# Patient Record
Sex: FEMALE | Race: WHITE | NOT HISPANIC OR LATINO | Employment: OTHER | URBAN - METROPOLITAN AREA
[De-identification: names, ages, dates, MRNs, and addresses within clinical notes are randomized per-mention and may not be internally consistent; named-entity substitution may affect disease eponyms.]

---

## 2017-07-11 ENCOUNTER — HOSPITAL ENCOUNTER (OUTPATIENT)
Dept: GENERAL RADIOLOGY | Facility: HOSPITAL | Age: 67
Discharge: HOME | End: 2017-07-11
Attending: INTERNAL MEDICINE | Admitting: INTERNAL MEDICINE

## 2017-07-11 DIAGNOSIS — M17.9 OA (OSTEOARTHRITIS) OF KNEE: ICD-10-CM

## 2017-07-11 PROCEDURE — 73560 X-RAY EXAM OF KNEE 1 OR 2: CPT

## 2017-11-16 ENCOUNTER — OFFICE VISIT (OUTPATIENT)
Dept: ORTHOPEDIC SURGERY | Facility: CLINIC | Age: 67
End: 2017-11-16

## 2017-11-16 VITALS — WEIGHT: 211 LBS | HEIGHT: 66 IN | BODY MASS INDEX: 33.91 KG/M2 | TEMPERATURE: 98.5 F

## 2017-11-16 DIAGNOSIS — M17.11 PRIMARY OSTEOARTHRITIS OF RIGHT KNEE: Primary | ICD-10-CM

## 2017-11-16 PROCEDURE — 99213 OFFICE O/P EST LOW 20 MIN: CPT | Performed by: ORTHOPAEDIC SURGERY

## 2017-11-16 RX ORDER — UBIDECARENONE 30 MG
CAPSULE ORAL
COMMUNITY
Start: 2015-09-16 | End: 2022-09-29

## 2017-11-16 RX ORDER — PHENOL 1.4 %
600 AEROSOL, SPRAY (ML) MUCOUS MEMBRANE 2 TIMES DAILY WITH MEALS
COMMUNITY
End: 2022-09-29

## 2017-11-16 RX ORDER — SENNOSIDES 8.6 MG
650 CAPSULE ORAL EVERY 8 HOURS PRN
COMMUNITY

## 2017-11-16 RX ORDER — LEVOTHYROXINE SODIUM 0.03 MG/1
25 TABLET ORAL DAILY
COMMUNITY
Start: 2015-09-16
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
## 2017-12-01 ENCOUNTER — HOSPITAL ENCOUNTER (OUTPATIENT)
Dept: PHYSICAL THERAPY | Facility: HOSPITAL | Age: 67
Setting detail: THERAPIES SERIES
Discharge: HOME | End: 2017-12-01
Attending: ORTHOPAEDIC SURGERY

## 2017-12-01 DIAGNOSIS — G89.29 CHRONIC PAIN OF RIGHT KNEE: Primary | ICD-10-CM

## 2017-12-01 DIAGNOSIS — M25.561 CHRONIC PAIN OF RIGHT KNEE: Primary | ICD-10-CM

## 2017-12-01 DIAGNOSIS — Z74.09 IMPAIRED MOBILITY: ICD-10-CM

## 2017-12-01 PROCEDURE — G8979 MOBILITY GOAL STATUS: HCPCS | Performed by: PHYSICAL THERAPIST

## 2017-12-01 PROCEDURE — G8978 MOBILITY CURRENT STATUS: HCPCS | Performed by: PHYSICAL THERAPIST

## 2017-12-01 PROCEDURE — 97161 PT EVAL LOW COMPLEX 20 MIN: CPT | Performed by: PHYSICAL THERAPIST

## 2017-12-01 PROCEDURE — 97110 THERAPEUTIC EXERCISES: CPT | Performed by: PHYSICAL THERAPIST

## 2017-12-01 NOTE — THERAPY EVALUATION
"    Outpatient Physical Therapy Ortho Initial Evaluation  [facility]     Patient Name: Marjorie MORRIS  : 1950  MRN: [id number]  Today's Date: 2017      Visit Date: 2017    There is no problem list on file for this patient.       No past medical history on file.     Past Surgical History:   Procedure Laterality Date   •  SECTION     • HIP SURGERY Right    • TONSILLECTOMY         Visit Dx:     ICD-10-CM ICD-9-CM   1. Chronic pain of right knee M25.561 719.46    G89.29 338.29   2. Impaired mobility Z74.09 799.89             Patient History       17 1300          History    Chief Complaint Difficulty Walking;Difficulty with daily activities;Pain  -GJ      Type of Pain Knee pain   R  -GJ      Date Current Problem(s) Began --   >/= 1 year  -GJ      Brief Description of Current Complaint Ms. Morris is a 66 y/o female. She reports apparently insidious onset of R knee pain >/= 1 year ago.  She reports periods of exacerbation/improvement, 2017 having a significant exacerbation after gardening.  No treatment to date.  She reports balance feels off because of knee.  She \"doesn't want to lock her R knee out\".  Wants to avoid surgery.  She does belong to [employer] and has performed water exercises in the past.    -GJ      Previous treatment for THIS PROBLEM --   nothing  -GJ      Onset Date- PT 17  -GJ      Patient/Caregiver Goals Relieve pain;Improve mobility;Know what to do to help the symptoms  -GJ      Occupation/sports/leisure activities gardening, singing  -GJ      What clinical tests have you had for this problem? X-ray  -GJ      Are you or can you be pregnant No  -GJ      Pain     Pain Location Knee   R  -GJ      Pain at Present 1  -GJ      Pain at Best 1  -GJ      Pain at Worst 4  -GJ      What Performance Factors Make the Current Problem(s) WORSE? walking, steps (descending more then ascendig), sit to stand  -GJ      Fall Risk Assessment    Any falls in the past year: No  " -GJ      Daily Activities    Primary Language English  -GJ      Are you able to read Yes  -GJ      Are you able to write Yes  -GJ      How does patient learn best? Reading  -GJ      Teaching needs identified Home Exercise Program;Management of Condition  -GJ      Barriers to learning None  -GJ      Pt Participated in POC and Goals Yes  -GJ      Safety    Are you being hurt, hit, or frightened by anyone at home or in your life? No  -GJ      Are you being neglected by a caregiver No  -GJ        User Key  (r) = Recorded By, (t) = Taken By, (c) = Cosigned By    Initials Name Provider Type    JOSE LUIS Schroeder, PT Physical Therapist                PT Ortho       12/01/17 1400    Posture/Observations    Alignment Options Genu valgus  -GJ    Genu valgus Bilateral:;Moderate  -GJ    Quarter Clearing    Quarter Clearing Lower Quarter Clearing  -GJ    DTR- Lower Quarter Clearing    Patellar tendon (L2-4) Bilateral:;2- Normal response  -GJ    Achilles tendon (S1-2) Bilateral:;2- Normal response  -GJ    Myotomal Screen- Lower Quarter Clearing    Hip flexion (L2) Bilateral:;4 (Good)  -GJ    Ankle DF (L4) Bilateral:;4+ (Good +)  -GJ    Ankle PF (S1) Bilateral:;3- (Fair -)  -GJ    Special Tests/Palpation    Special Tests/Palpation Knee  -GJ    Knee Palpation    Knee Palpation? Yes   ? R baker's cyst, (+) trigger points B gastrocs  -GJ    Knee Special Tests    Oz’s sign (DVT) Bilateral:;Negative  -GJ    ROM (Range of Motion)    General ROM lower extremity range of motion deficits identified  -GJ    General LE Assessment    ROM knee, left: LE ROM deficit;knee, right: LE ROM deficit  -GJ    Left Knee    Extension/Flexion AROM Deficit   -GJ    Extension/Flexion PROM Deficit 8-125  -GJ    Right Knee    Extension/Flexion AROM Deficit   -GJ    Extension/Flexion PROM Deficit   -GJ    MMT (Manual Muscle Testing)    General MMT Assessment lower extremity strength deficits identified  -GJ    Left Hip    Hip Extension  Gross Movement (4/5) good;(4+/5) good plus  -GJ    Hip ABduction Gross Movement (4/5) good;(4+/5) good plus  -GJ    Right Hip    Hip Extension Gross Movement (4/5) good;(4+/5) good plus  -GJ    Hip ABduction Gross Movement (4/5) good;(4+/5) good plus  -GJ    Lower Extremity    Lower Ext Manual Muscle Testing left hip strength deficit;right hip strength deficit;left knee strength deficit;right knee strength deficit  -GJ    Left Knee    Knee Extension Gross Movement (4+/5) good plus;(4/5) good   within available range  -GJ    Knee Flexion Gross Movement (4/5) good;(4+/5) good plus   within available ragne  -GJ    Right Knee    Knee Extension Gross Movement (4/5) good;(4+/5) good plus   within available ragne  -GJ    Knee Flexion Gross Movement (4/5) good;(4+/5) good plus   within available ragne  -GJ    Flexibility    Flexibility Tested? Lower Extremity  -GJ    Lower Extremity Flexibility    Hamstrings Bilateral:;Moderately limited  -GJ    Hip Flexors Bilateral:;Moderately limited  -GJ    Quadriceps Bilateral:;Moderately limited  -GJ    Hip External Rotators Bilateral:;Moderately limited  -GJ    Hip Internal Rotators Bilateral:;Moderately limited  -GJ    Gastrocnemius Bilateral:;Moderately limited  -GJ      User Key  (r) = Recorded By, (t) = Taken By, (c) = Cosigned By    Initials Name Provider Type    JOSE LUIS Schroeder PT Physical Therapist                            Therapy Education       12/01/17 3612          Therapy Education    Education Details discussed dx, px, poc, discused anatomy of the knee/lower kinetic chain, and physiology of healing. Discussed realistic expectations including time frames, including probable soreness for first several weeks.  Discussed importance of performing HEP on an independent basis during and after therapy is completed to allow for optimal outcomes, discussed shoe wear, discussed benefits of use of cane to decrease stress to tissues.  discussed use of ice, discussed benefits of  aquatc therapy.  HEP to be performed once every other day.   -GJ      Given HEP;Symptoms/condition management;Pain management;Posture/body mechanics;Edema management;Mobility training  -GJ      Program New  -GJ      How Provided Verbal;Demonstration;Written  -GJ      Provided to Patient  -GJ      Level of Understanding Teach back education performed;Verbalized;Demonstrated  -GJ        User Key  (r) = Recorded By, (t) = Taken By, (c) = Cosigned By    Initials Name Provider Type    JOSE LUIS Schroeder, PT Physical Therapist                PT OP Goals       12/01/17 1400       PT Short Term Goals    STG Date to Achieve 01/05/18  -GJ     STG 1 pt. to be I with initial HEP to facilitate self management of their condition  -GJ     STG 1 Progress New  -GJ     STG 2 pt. to be educated in/verbalize understanding of the importance of posture/ergonomics in association with their condition to facilitate self management of their condition  -GJ     STG 2 Progress New  -GJ     Long Term Goals    LTG Date to Achieve 02/02/18  -GJ     LTG 1 pt. to be I with advanced HEP to facilitate self management of their condition  -GJ     LTG 1 Progress New  -GJ     LTG 2 pt to demonstrate R knee AROM extension bettern then or equal to 8 degrees short of neutral to faciliate ease/safety with gait  -GJ     LTG 2 Progress New  -GJ     LTG 3 pt to ascend/descend stairs reciprocally with </= 1 rail to facilitate ease/safety with household/community mobility  -GJ     LTG 3 Progress New  -GJ     LTG 4 pt to demonstrate near normal heel to toe gait pattern (with/without AD) to facilitate ease/safety with community mobility  -GJ     LTG 4 Progress New  -GJ     Time Calculation    PT Goal Re-Cert Due Date 02/01/18  -GJ       User Key  (r) = Recorded By, (t) = Taken By, (c) = Cosigned By    Initials Name Provider Type    JOSE LUIS Schroeder PT Physical Therapist                PT Assessment/Plan       12/01/17 1414       PT Assessment    Functional  "Limitations Impaired gait;Impaired locomotion;Limitation in home management;Performance in self-care ADL;Performance in leisure activities;Limitations in community activities  -GJ     Impairments Gait;Impaired flexibility;Muscle strength;Pain;Impaired postural alignment;Poor body mechanics;Joint mobility;Impaired muscle length;Range of motion  -GJ     Assessment Comments Ms. Almaraz is a 68 y/o female, history of R BRIAN 2013. She reports apparently insidious onset of R knee pain >/= 1 year ago.  She reports periods of exacerbation/improvement, 9/2017 having a significant exacerbation after gardening.  No treatment to date.  She reports balance feels off because of knee.  She \"doesn't want to lock her R knee out\".  Wants to avoid surgery.  She does belong to [facility] [place] and has performed water exercises in the past.  Ms. Almaraz ambulates with decrease pelvic trunk disassociation, bilateral R>L genu valgus.  She demonstrates limited R knee A/PROM (A: , PROM  more so then L (, P 8-125).  She demonstrates gross weakness of bilateral LE tissues, demonstrates, shorted HS, hip flexor, gastroc/soleus tissues.  Ms. Almaraz demonstrates Eason's cyst like pocket of fluid posterior R knee.  She demonstrates s/s consistent of stable, intermittently chronic R > L knee dengeneative changes which limit her participation in household (has 3 story house), community, leisure activities.  She may benefit from skilled physical therapy intervention to address the above impairments, including establishment of an aquatic therapy program to be performed independently at the [facility] [place] (is a member).   -GJ     Please refer to paper survey for additional self-reported information Yes  -GJ     Rehab Potential Good  -GJ     Patient/caregiver participated in establishment of treatment plan and goals Yes  -GJ     Patient would benefit from skilled therapy intervention Yes  -GJ     PT Plan    PT Frequency 2x/week  -GJ     Predicted " Duration of Therapy Intervention (days/wks) 4-6 weeks   -GJ     Planned CPT's? PT EVAL LOW COMPLEXITY: 57721;PT RE-EVAL: 78966;PT THER PROC EA 15 MIN: 99119;PT MANUAL THERAPY EA 15 MIN: 36990;PT NEUROMUSC RE-EDUCATION EA 15 MIN: 94021;PT AQUATIC THERAPY EA 15 MIN: 21694;PT GAIT TRAINING EA 15 MIN: 77753;PT HOT OR COLD PACK TREAT MCARE;PT ELECTRICAL STIM UNATTEND:   -GJ     PT Plan Comments to perform 2-4 sessions in the pool to develop program for independent aquatic program (member of [employer]).  Work on hip girdle, quad, HS, gastrc strengthening, stretch gastroc, hip flexor/quad, HS, hip rotator tissues.  Then 2-4 sessions on land to develop a land based program as well.    -GJ       User Key  (r) = Recorded By, (t) = Taken By, (c) = Cosigned By    Initials Name Provider Type    JOSE LUIS Schroeder PT Physical Therapist                  Exercises       12/01/17 1400          Exercise 1    Exercise Name 1 SL hip abd, bilaterally  -GJ      Cueing 1 Demo  -GJ      Reps 1 12  -GJ      Exercise 2    Exercise Name 2 HR, standing  -GJ      Cueing 2 Demo  -GJ      Reps 2 15  -GJ      Exercise 3    Exercise Name 3 standign HS curl  -GJ      Cueing 3 Demo  -GJ      Reps 3 15  -GJ      Exercise 4    Exercise Name 4 seated TKE into ball  -GJ      Cueing 4 Demo  -GJ      Reps 4 15  -GJ      Time (Seconds) 4 5  -GJ      Exercise 5    Exercise Name 5 prone quad/hip flexor stretch with belt/sheet  -GJ      Cueing 5 Demo  -GJ      Reps 5 3  -GJ      Time (Seconds) 5 20  -GJ      Exercise 6    Exercise Name 6 seated HS stretch (edge of chair)  -GJ      Cueing 6 Demo  -GJ      Reps 6 3  -GJ      Time (Seconds) 6 20  -GJ        User Key  (r) = Recorded By, (t) = Taken By, (c) = Cosigned By    Initials Name Provider Type    JOSE LUIS Schroeder PT Physical Therapist                              Outcome Measures       12/01/17 1400          Knee Outcome Score    Knee Outcome Score Comments form incomplete  -GJ      Functional Assessment     Outcome Measure Options Knee Outcome Score- ADL  -GJ        User Key  (r) = Recorded By, (t) = Taken By, (c) = Cosigned By    Initials Name Provider Type    GJ Leroy Schroeder, PT Physical Therapist            Time Calculation:   Start Time: 1045  Stop Time: 1130  Time Calculation (min): 45 min     Therapy Charges for Today     Code Description Service Date Service Provider Modifiers Qty    75007604606 HC PT MOBILITY CURRENT 12/1/2017 Leroy Schroeder, PT GP, CJ 1    47126796529 HC PT MOBILITY PROJECTED 12/1/2017 Leroy Schroeder, PT GP, CI 1    31797873308 HC PT EVAL LOW COMPLEXITY 2 12/1/2017 Leroy Schroeder, PT GP 1    88654985528 HC PT THER PROC EA 15 MIN 12/1/2017 Leroy Schroeder, PT GP 1          PT G-Codes  PT Professional Judgement Used?: Yes  Outcome Measure Options: Knee Outcome Score- ADL  Functional Limitation: Mobility: Walking and moving around  Mobility: Walking and Moving Around Current Status (): At least 20 percent but less than 40 percent impaired, limited or restricted  Mobility: Walking and Moving Around Goal Status (): At least 1 percent but less than 20 percent impaired, limited or restricted         Leroy Schroeder, PT  12/1/2017

## 2017-12-04 ENCOUNTER — HOSPITAL ENCOUNTER (OUTPATIENT)
Dept: PHYSICAL THERAPY | Facility: HOSPITAL | Age: 67
Setting detail: THERAPIES SERIES
Discharge: HOME | End: 2017-12-04

## 2017-12-04 DIAGNOSIS — Z74.09 IMPAIRED MOBILITY: ICD-10-CM

## 2017-12-04 DIAGNOSIS — M25.561 CHRONIC PAIN OF RIGHT KNEE: Primary | ICD-10-CM

## 2017-12-04 DIAGNOSIS — G89.29 CHRONIC PAIN OF RIGHT KNEE: Primary | ICD-10-CM

## 2017-12-04 PROCEDURE — 97113 AQUATIC THERAPY/EXERCISES: CPT

## 2017-12-04 NOTE — THERAPY TREATMENT NOTE
"    Outpatient Physical Therapy Ortho Treatment Note  [facility]     Patient Name: Marjorie MORRIS  : 1950  MRN: [id number]  Today's Date: 2017      Visit Date: 2017    Visit Dx:    ICD-10-CM ICD-9-CM   1. Chronic pain of right knee M25.561 719.46    G89.29 338.29   2. Impaired mobility Z74.09 799.89       There is no problem list on file for this patient.       No past medical history on file.     Past Surgical History:   Procedure Laterality Date   •  SECTION     • HIP SURGERY Right    • TONSILLECTOMY                               PT Assessment/Plan       17 1553       PT Assessment    Assessment Comments Today was pts first aquatic session. Pt performed exercises without any immediate adverse affects. Pt required moderate cuing for TA activation. HS appeared tight with stretching. Pt reported that she used to perform aquatic exercises but [facility] Pt is appropriate to continue aquatic therapy to strengthen and decrease pain in knee.  -CK (r) NS (t) CK (c)     PT Plan    PT Plan Comments Assess ot reponse to first aquatic session; continue with current exercises, progress as tolerated. Consider performing step ups on 8\" step, and consider performing DKTC stretch.  -CK (r) NS (t) CK (c)       User Key  (r) = Recorded By, (t) = Taken By, (c) = Cosigned By    Initials Name Provider Type    QUEENIE Leavitt, PT Physical Therapist    BRIE Quick, PT Student PT Student                    Exercises       17 1500          Subjective Comments    Subjective Comments I'm not doing bad today, feeling good actually. I have a total hip replacement on the R, that hip feels tighter.  -CK (r) NS (t) CK (c)      Subjective Pain    Able to rate subjective pain? yes  -CK (r) NS (t) CK (c)      Pre-Treatment Pain Level 1  -CK (r) NS (t) CK (c)      Aquatics    Aquatics performed? Yes  -CK (r) NS (t) CK (c)      Aquatics LE    Water Walk forward;side;backward   x 3 " "laps  -CK (r) NS (t) CK (c)      Stretch 1 HS sweep x 10, SN  -CK (r) NS (t) CK (c)      Stretch 2 Quad stretch x 30s, SN  -CK (r) NS (t) CK (c)      Stretch 3 Piriformis stretch x 30s  -CK (r) NS (t) CK (c)      Stretch Other 1 Calf stretch x 30s B  -CK (r) NS (t) CK (c)      Stretch Other 2 Kickboard push pull x 10  -CK (r) NS (t) CK (c)      Abdominals noodle   SN x 15  -CK (r) NS (t) CK (c)      Clams HS curls x 15  -CK (r) NS (t) CK (c)      Hip Abd/Add x15  -CK (r) NS (t) CK (c)      Hip Flex/Ext Flex x 10, cue for quad set  -CK (r) NS (t) CK (c)      March in Place x 2 laps  -CK (r) NS (t) CK (c)      Mini Squat x 10  -CK (r) NS (t) CK (c)      Toe/Heel Raises tiptoe/tandem x 2 laps  -CK (r) NS (t) CK (c)      Uni-Squat Blue ring push down x 15 B  -CK (r) NS (t) CK (c)      Uni-Clock 10/10 cw/ccw  -CK (r) NS (t) CK (c)      Step Ups 10/leg, 4\" step  -CK (r) NS (t) CK (c)        User Key  (r) = Recorded By, (t) = Taken By, (c) = Cosigned By    Initials Name Provider Type    CK Art Leavitt, PT Physical Therapist    BRIE Quick, PT Student PT Student                               PT OP Goals       12/04/17 1700       PT Short Term Goals    STG Date to Achieve 01/05/18  -CK     STG 1 pt. to be I with initial HEP to facilitate self management of their condition  -CK     STG 1 Progress Ongoing  -CK     STG 2 pt. to be educated in/verbalize understanding of the importance of posture/ergonomics in association with their condition to facilitate self management of their condition  -CK     STG 2 Progress New  -CK     Long Term Goals    LTG Date to Achieve 02/02/18  -CK     LTG 1 pt. to be I with advanced HEP to facilitate self management of their condition  -CK     LTG 1 Progress Ongoing  -CK     LTG 2 pt to demonstrate R knee AROM extension bettern then or equal to 8 degrees short of neutral to faciliate ease/safety with gait  -CK     LTG 2 Progress Ongoing  -CK     LTG 3 pt to ascend/descend stairs reciprocally " with </= 1 rail to facilitate ease/safety with household/community mobility  -CK     LTG 3 Progress Ongoing  -CK     LTG 4 pt to demonstrate near normal heel to toe gait pattern (with/without AD) to facilitate ease/safety with community mobility  -CK     LTG 4 Progress Ongoing  -CK       User Key  (r) = Recorded By, (t) = Taken By, (c) = Cosigned By    Initials Name Provider Type    QUEENIE Leavitt, PT Physical Therapist                    Time Calculation:   Start Time: 1515  Stop Time: 1602  Time Calculation (min): 47 min    Therapy Charges for Today     Code Description Service Date Service Provider Modifiers Qty    33779270255 HC PT AQUATIC THERAPY EA 15 MIN 12/4/2017 Uri uQick, PT Student GP 3                    Uri Quick, PT Student  12/4/2017

## 2017-12-06 ENCOUNTER — HOSPITAL ENCOUNTER (OUTPATIENT)
Dept: PHYSICAL THERAPY | Facility: HOSPITAL | Age: 67
Setting detail: THERAPIES SERIES
Discharge: HOME | End: 2017-12-06

## 2017-12-06 DIAGNOSIS — G89.29 CHRONIC PAIN OF RIGHT KNEE: Primary | ICD-10-CM

## 2017-12-06 DIAGNOSIS — Z74.09 IMPAIRED MOBILITY: ICD-10-CM

## 2017-12-06 DIAGNOSIS — M25.561 CHRONIC PAIN OF RIGHT KNEE: Primary | ICD-10-CM

## 2017-12-06 PROCEDURE — 97113 AQUATIC THERAPY/EXERCISES: CPT

## 2017-12-06 NOTE — THERAPY TREATMENT NOTE
"    Outpatient Physical Therapy Ortho Treatment Note  [facility]     Patient Name: Marjorie MORRIS  : 1950  MRN: [id number]  Today's Date: 2017      Visit Date: 2017    Visit Dx:    ICD-10-CM ICD-9-CM   1. Chronic pain of right knee M25.561 719.46    G89.29 338.29   2. Impaired mobility Z74.09 799.89       There is no problem list on file for this patient.       No past medical history on file.     Past Surgical History:   Procedure Laterality Date   •  SECTION     • HIP SURGERY Right    • TONSILLECTOMY                               PT Assessment/Plan       17 1103       PT Assessment    Assessment Comments Pt reported minor soreness after previous session, only noticable with the weather change. Pt was given increased resistance for exercises to promote strengthening.  She required moderate cuing for TA activation during exercises.   -CK (r) NS (t) CK (c)     PT Plan    PT Plan Comments Assess response to previous session, consider performing paddlework to improve core strength, consider adding suspended bicycles for knee mobility/endurance and core strength, increase step height to 8\".  -CK (r) NS (t) CK (c)       User Key  (r) = Recorded By, (t) = Taken By, (c) = Cosigned By    Initials Name Provider Type    QUEENIE Leavitt, PT Physical Therapist    BRIE Quick, PT Student PT Student                    Exercises       17 1000          Subjective Comments    Subjective Comments I wasn't really sore after last time. With the weather change and the fact I ran out of tylenol yesterday I felt a little soreness, but not bad.  -CK (r) NS (t) CK (c)      Subjective Pain    Able to rate subjective pain? yes  -CK (r) NS (t) CK (c)      Pre-Treatment Pain Level 1  -CK (r) NS (t) CK (c)      Aquatics LE    Water Walk forward;side;backward   x3 laps  -CK (r) NS (t) CK (c)      Stretch 1 HS sweep x 15, SN  -CK (r) NS (t) CK (c)      Stretch 2 Quad stretch 2 x 30s, SN  -CK (r) " "NS (t) CK (c)      Stretch 3 Piriformis stretch 2 x 30s  -CK (r) NS (t) CK (c)      Stretch Other 1 Calf stretch 2 x 30s B  -CK (r) NS (t) CK (c)      Stretch Other 2 Kickboard push pull x 15  -CK (r) NS (t) CK (c)      Abdominals noodle   LN x15  -CK (r) NS (t) CK (c)      Clams HS curls x 15, 1.5#  -CK (r) NS (t) CK (c)      Hip Abd/Add x15, 1.5#  -CK (r) NS (t) CK (c)      Hip Flex/Ext Flex x 10, cue for quad set, 1.5#  -CK (r) NS (t) CK (c)      March in Place x 2 laps  -CK (r) NS (t) CK (c)      Mini Squat x 10  -CK (r) NS (t) CK (c)      Toe/Heel Raises tiptoe/tandem x 2 laps  -CK (r) NS (t) CK (c)      Uni-Squat Blue ring push down x 15 B  -CK (r) NS (t) CK (c)      Uni-Clock 10/10 cw/ccw, 1.5#  -CK (r) NS (t) CK (c)      Step Ups 10/leg, 4\" step  -CK (r) NS (t) CK (c)        User Key  (r) = Recorded By, (t) = Taken By, (c) = Cosigned By    Initials Name Provider Type    QUEENIE Leavitt, PT Physical Therapist    BRIE Quick, PT Student PT Student                                       Time Calculation:   Start Time: 1030  Stop Time: 1115  Time Calculation (min): 45 min    Therapy Charges for Today     Code Description Service Date Service Provider Modifiers Qty    84111785966  PT AQUATIC THERAPY EA 15 MIN 12/6/2017 Uri Quick, PT Student GP 3                    Uri Quick, PT Student  12/6/2017       "

## 2017-12-11 ENCOUNTER — HOSPITAL ENCOUNTER (OUTPATIENT)
Dept: PHYSICAL THERAPY | Facility: HOSPITAL | Age: 67
Setting detail: THERAPIES SERIES
Discharge: HOME | End: 2017-12-11

## 2017-12-11 DIAGNOSIS — M25.561 CHRONIC PAIN OF RIGHT KNEE: Primary | ICD-10-CM

## 2017-12-11 DIAGNOSIS — Z74.09 IMPAIRED MOBILITY: ICD-10-CM

## 2017-12-11 DIAGNOSIS — G89.29 CHRONIC PAIN OF RIGHT KNEE: Primary | ICD-10-CM

## 2017-12-11 PROCEDURE — 97113 AQUATIC THERAPY/EXERCISES: CPT

## 2017-12-11 NOTE — THERAPY TREATMENT NOTE
"    Outpatient Physical Therapy Ortho Treatment Note  [facility]     Patient Name: Marjorie MORRIS  : 1950  MRN: [id number]  Today's Date: 2017      Visit Date: 2017    Visit Dx:    ICD-10-CM ICD-9-CM   1. Chronic pain of right knee M25.561 719.46    G89.29 338.29   2. Impaired mobility Z74.09 799.89       There is no problem list on file for this patient.       No past medical history on file.     Past Surgical History:   Procedure Laterality Date   •  SECTION     • HIP SURGERY Right    • TONSILLECTOMY                               PT Assessment/Plan       17 1238       PT Assessment    Assessment Comments Pt displayed difficulty maintaining balance with abdominals and kickboard push/pull, and required moderate cuing for TA activation. Increased step ups to an 8\" step for continued strengthening. She displayed decreased endurance with bicycles demonstrated by slight shortness of breath.  Continued work on HS and calf flexibility with hopes of improving knee extension  -CK     PT Plan    PT Plan Comments Assess pt response to increased step height, consider progressing exercise weights and resistances.  -CK (r) NS (t) CK (c)       User Key  (r) = Recorded By, (t) = Taken By, (c) = Cosigned By    Initials Name Provider Type    QUEENIE Leavitt, PT Physical Therapist    BRIE Quick, PT Student PT Student                    Exercises       17 1100          Subjective Comments    Subjective Comments I'm doing pretty well today, my R hip hurts some but besides that I'm good.  -CK (r) NS (t) CK (c)      Subjective Pain    Able to rate subjective pain? yes  -CK (r) NS (t) CK (c)      Pre-Treatment Pain Level 0  -CK (r) NS (t) CK (c)      Post-Treatment Pain Level 0  -CK (r) NS (t) CK (c)      Aquatics LE    Water Walk forward;side;backward    3 laps  -CK (r) NS (t) CK (c)      Stretch 1 HS sweep x 15, LN  -CK (r) NS (t) CK (c)      Stretch 2 Quad stretch 2 x 30s, LN  -CK " "(r) NS (t) CK (c)      Stretch 3 Piriformis stretch 2 x 30s  -CK (r) NS (t) CK (c)      Stretch Other 1 Calf stretch 2 x 30s B  -CK (r) NS (t) CK (c)      Stretch Other 2 Kickboard push pull x 15  -CK (r) NS (t) CK (c)      Vertical Traction Alternating lunge walk x 2 laps  -CK (r) NS (t) CK (c)      Abdominals noodle   LN x 20  -CK (r) NS (t) CK (c)      Clams HS curls x 15, 1.5#  -CK (r) NS (t) CK (c)      Hip Abd/Add x15, 1.5#  -CK (r) NS (t) CK (c)      Hip Flex/Ext --  -CK (r) NS (t) CK (c)      March in Place x 2 laps  -CK (r) NS (t) CK (c)      Mini Squat x 10  -CK (r) NS (t) CK (c)      Toe/Heel Raises tiptoe/tandem x 2 laps  -CK (r) NS (t) CK (c)      Uni-Squat LN push down x 20 B  -CK (r) NS (t) CK (c)      Uni-Clock 10/10 cw/ccw, 1.5#  -CK (r) NS (t) CK (c)      Step Ups 10/leg, 8\" step  -CK (r) NS (t) CK (c)      Bicycle x2 min suspended  -CK (r) NS (t) CK (c)        User Key  (r) = Recorded By, (t) = Taken By, (c) = Cosigned By    Initials Name Provider Type    CK Art Leavitt, PT Physical Therapist    BRIE Quick, PT Student PT Student                               PT OP Goals       12/11/17 1200       PT Short Term Goals    STG Date to Achieve 01/05/18  -CK     STG 1 pt. to be I with initial HEP to facilitate self management of their condition  -CK     STG 1 Progress Partially Met  -CK     STG 2 pt. to be educated in/verbalize understanding of the importance of posture/ergonomics in association with their condition to facilitate self management of their condition  -CK     STG 2 Progress Progressing  -CK     Long Term Goals    LTG Date to Achieve 02/02/18  -CK     LTG 1 pt. to be I with advanced HEP to facilitate self management of their condition  -CK     LTG 1 Progress Ongoing  -CK     LTG 1 Progress Comments progressing aqua program as tolerated  -CK     LTG 2 pt to demonstrate R knee AROM extension bettern then or equal to 8 degrees short of neutral to faciliate ease/safety with gait  -CK     " LTG 2 Progress Ongoing  -CK     LTG 3 pt to ascend/descend stairs reciprocally with </= 1 rail to facilitate ease/safety with household/community mobility  -CK     LTG 3 Progress Ongoing  -CK     LTG 3 Progress Comments working on step ups in the pool  -CK     LTG 4 pt to demonstrate near normal heel to toe gait pattern (with/without AD) to facilitate ease/safety with community mobility  -CK     LTG 4 Progress Ongoing  -CK       User Key  (r) = Recorded By, (t) = Taken By, (c) = Cosigned By    Initials Name Provider Type    QUEENIE Leavitt, PT Physical Therapist                    Time Calculation:   Start Time: 1159  Stop Time: 1245  Time Calculation (min): 46 min    Therapy Charges for Today     Code Description Service Date Service Provider Modifiers Qty    93510808648 HC PT AQUATIC THERAPY EA 15 MIN 12/11/2017 Uri Quick, PT Student GP 3                    Uri Quick, PT Student  12/11/2017

## 2017-12-13 ENCOUNTER — HOSPITAL ENCOUNTER (OUTPATIENT)
Dept: PHYSICAL THERAPY | Facility: HOSPITAL | Age: 67
Setting detail: THERAPIES SERIES
Discharge: HOME | End: 2017-12-13

## 2017-12-13 DIAGNOSIS — G89.29 CHRONIC PAIN OF RIGHT KNEE: Primary | ICD-10-CM

## 2017-12-13 DIAGNOSIS — M25.561 CHRONIC PAIN OF RIGHT KNEE: Primary | ICD-10-CM

## 2017-12-13 DIAGNOSIS — Z74.09 IMPAIRED MOBILITY: ICD-10-CM

## 2017-12-13 PROCEDURE — 97113 AQUATIC THERAPY/EXERCISES: CPT | Performed by: PHYSICAL THERAPIST

## 2017-12-13 NOTE — THERAPY TREATMENT NOTE
Outpatient Physical Therapy Ortho Treatment Note  [facility]     Patient Name: Marjorie MORRIS  : 1950  MRN: [id number]  Today's Date: 2017      Visit Date: 2017    Visit Dx:    ICD-10-CM ICD-9-CM   1. Chronic pain of right knee M25.561 719.46    G89.29 338.29   2. Impaired mobility Z74.09 799.89       There is no problem list on file for this patient.       No past medical history on file.     Past Surgical History:   Procedure Laterality Date   •  SECTION     • HIP SURGERY Right    • TONSILLECTOMY                               PT Assessment/Plan       17 1320       PT Assessment    Assessment Comments Reviewed all pool exercises prior to end of session. Comprehensive handouts given for patient to self manage program independently. Plan to transition to land based therapy program next week.  -CK     PT Plan    PT Plan Comments transition to land program next session.   -CK       User Key  (r) = Recorded By, (t) = Taken By, (c) = Cosigned By    Initials Name Provider Type    QUEENIE Leavitt, PT Physical Therapist                    Exercises       17 1300          Subjective Comments    Subjective Comments This is my test  -CK      Subjective Pain    Able to rate subjective pain? yes  -CK      Pre-Treatment Pain Level 0  -CK      Post-Treatment Pain Level 0  -CK      Aquatics LE    Water Walk forward;side;backward   x 3 laps with TA  -CK      Stretch 1 HS sweep x 15, LN  -CK      Stretch 2 Quad stretch 2 x 30s, LN  -CK      Stretch 3 Piriformis stretch 2 x 30s  -CK      Stretch Other 1 Calf stretch 2 x 30s B  -CK      Abdominals noodle   Large x 20  -CK      Clams suspended on noodle x 2min  -CK      Hip Abd/Add B 15x  -CK      Hip Flex/Ext Ext B 15x  -CK      March in Place x 2 laps  -CK      Mini Squat x 15  -CK      Toe/Heel Raises tiptoe/tandem x 2 laps  -CK      Uni-Squat LN push down x 20 B  -CK      Uni-Clock 10/10 cw/ccw, 1.5#  -CK      Bicycle x2 min  suspended  -CK      Exercise 1    Exercise Name 1 HS curls x 15, 1.5#  -CK      Exercise 2    Exercise Name 2 BUE paddle work: shoulder flex/ext, abd/add  -CK      Reps 2 15  -CK      Additional Comments L5  -CK        User Key  (r) = Recorded By, (t) = Taken By, (c) = Cosigned By    Initials Name Provider Type    CK Art Leavitt, PT Physical Therapist                             Therapy Education  Education Details: Comprehensive pool program given and reviewed  Given: HEP, Bandaging/dressing change  Program: Reinforced  How Provided: Verbal, Demonstration, Written  Provided to: Patient  Level of Understanding: Teach back education performed, Verbalized              Time Calculation:   Start Time: 1030  Stop Time: 1130  Time Calculation (min): 60 min    Therapy Charges for Today     Code Description Service Date Service Provider Modifiers Qty    88336537189 HC PT AQUATIC THERAPY EA 15 MIN 12/13/2017 Art Leavitt, PT GP 4                    Art Leavitt, PT  12/13/2017

## 2017-12-18 ENCOUNTER — HOSPITAL ENCOUNTER (OUTPATIENT)
Dept: PHYSICAL THERAPY | Facility: HOSPITAL | Age: 67
Setting detail: THERAPIES SERIES
Discharge: HOME | End: 2017-12-18

## 2017-12-18 DIAGNOSIS — M25.561 CHRONIC PAIN OF RIGHT KNEE: Primary | ICD-10-CM

## 2017-12-18 DIAGNOSIS — Z74.09 IMPAIRED MOBILITY: ICD-10-CM

## 2017-12-18 DIAGNOSIS — G89.29 CHRONIC PAIN OF RIGHT KNEE: Primary | ICD-10-CM

## 2017-12-18 PROCEDURE — 97110 THERAPEUTIC EXERCISES: CPT | Performed by: PHYSICAL THERAPIST

## 2017-12-18 NOTE — THERAPY TREATMENT NOTE
Outpatient Physical Therapy Ortho Treatment Note  [facility]     Patient Name: Marjorie MORRIS  : 1950  MRN: [id number]  Today's Date: 2017      Visit Date: 2017    Visit Dx:    ICD-10-CM ICD-9-CM   1. Chronic pain of right knee M25.561 719.46    G89.29 338.29   2. Impaired mobility Z74.09 799.89       There is no problem list on file for this patient.       No past medical history on file.     Past Surgical History:   Procedure Laterality Date   •  SECTION     • HIP SURGERY Right    • TONSILLECTOMY               PT Ortho       17 1100    Right Knee    Extension/Flexion AROM Deficit R knee AROM ext 12 short of full neutral  -      User Key  (r) = Recorded By, (t) = Taken By, (c) = Cosigned By    Initials Name Provider Type    JOSE LUIS Schroeder, PT Physical Therapist                            PT Assessment/Plan       17 1448       PT Assessment    Assessment Comments Ms. Ever Huerta returns having completed aquatic therapy.  She reports fear that her knee is going to lock up, however reports that it never has. We discussed adherence to HEP and activity modification. She demonstrates R knee AROM 12 degrees short of full neutral.  We added several stretch/strenghtening activities to land program.  Plan to see tiwce more prior to DC independent management to be performed every other day.   -     PT Plan    PT Plan Comments to see twice more, add gayle gayle to normalize gait pattern, ensure independence with HEP prior to DC to HEP (to be performed every other day).   -       User Key  (r) = Recorded By, (t) = Taken By, (c) = Cosigned By    Initials Name Provider Type    JOSE LUIS Schroeder, PT Physical Therapist                    Exercises       17 1100          Subjective Comments    Subjective Comments The pool was good, I felt more limber when I got out, so I'll definetely get back into a pool routine, it's just hectic right now.  I am feeling it a bit  today, maybe it's the weather.   -GJ      Subjective Pain    Pre-Treatment Pain Level 4  -GJ      Exercise 1    Exercise Name 1 SL hip abd, bilaterally   -GJ      Cueing 1 Demo  -GJ      Reps 1 12  -GJ      Exercise 2    Exercise Name 2 HR  -GJ      Cueing 2 Demo  -GJ      Reps 2 20  -GJ      Exercise 3    Exercise Name 3 standign HS curl  -GJ      Cueing 3 Verbal  -GJ      Sets 3 2  -GJ      Reps 3 10  -GJ      Additional Comments 2#  -GJ      Exercise 4    Exercise Name 4 seated TKE into ball  -GJ      Cueing 4 Verbal  -GJ      Reps 4 15  -GJ      Time (Seconds) 4 5  -GJ      Exercise 5    Exercise Name 5 prone quad/hip flexor stretch with belt/sheet  -GJ      Cueing 5 Demo  -GJ      Reps 5 3  -GJ      Time (Seconds) 5 20  -GJ      Exercise 6    Exercise Name 6 seated HS stretch (edge of chair)  -GJ      Cueing 6 Demo  -GJ      Reps 6 3  -GJ      Time (Seconds) 6 20  -GJ      Exercise 7    Exercise Name 7 Nustep ,   -GJ      Cueing 7 Verbal  -GJ      Time (Minutes) 7 5  -GJ      Exercise 8    Exercise Name 8 standing hip abd, bilaterally  -GJ      Cueing 8 Demo  -GJ      Sets 8 2  -GJ      Reps 8 10  -GJ      Additional Comments 2#  -GJ      Exercise 9    Exercise Name 9 gastroc stretch  -GJ      Cueing 9 Demo  -GJ      Reps 9 3  -GJ      Additional Comments 20  -GJ      Exercise 10    Exercise Name 10 QS  -GJ      Cueing 10 Demo  -GJ      Reps 10 15  -GJ      Time (Seconds) 10 5  -GJ        User Key  (r) = Recorded By, (t) = Taken By, (c) = Cosigned By    Initials Name Provider Type    JOSE LUIS Schroeder, PT Physical Therapist                               PT OP Goals       12/18/17 1100       PT Short Term Goals    STG Date to Achieve 01/05/18  -GJ     STG 1 pt. to be I with initial HEP to facilitate self management of their condition  -GJ     STG 1 Progress Partially Met  -GJ     STG 1 Progress Comments pt has aquatic program, reviewed land program today  -GJ     STG 2 pt. to be educated in/verbalize  understanding of the importance of posture/ergonomics in association with their condition to facilitate self management of their condition  -     STG 2 Progress Partially Met  -GJ     STG 2 Progress Comments reviewed, discussed activity modifications  -     Long Term Goals    LTG Date to Achieve 02/02/18  -     LTG 1 pt. to be I with advanced HEP to facilitate self management of their condition  -GJ     LTG 1 Progress Ongoing  -     LTG 2 pt to demonstrate R knee AROM extension bettern then or equal to 8 degrees short of neutral to faciliate ease/safety with gait  -GJ     LTG 2 Progress Ongoing  -GJ     LTG 2 Progress Comments R knee AROM ext 12 from full neutral  -     LTG 3 pt to ascend/descend stairs reciprocally with </= 1 rail to facilitate ease/safety with household/community mobility  -GJ     LTG 3 Progress Ongoing  -GJ     LTG 4 pt to demonstrate near normal heel to toe gait pattern (with/without AD) to facilitate ease/safety with community mobility  -     LTG 4 Progress Ongoing  -GJ     LTG 4 Progress Comments pt demonstrating limp, no AD  -       User Key  (r) = Recorded By, (t) = Taken By, (c) = Cosigned By    Initials Name Provider Type    JOSE LUIS Schroeder, PT Physical Therapist          Therapy Education  Education Details: HEP (regardless of aquatic or land) to be only once every other day.  Discussed obtaining ankle weights for home as well as a ball to help with seated TKE to allow for improved adherence to HEP.    Given: HEP, Symptoms/condition management, Pain management, Posture/body mechanics, Mobility training, Edema management  Program: New  How Provided: Verbal, Demonstration, Written  Provided to: Patient  Level of Understanding: Verbalized, Demonstrated, Teach back education performed              Time Calculation:   Start Time: 1119  Stop Time: 1203  Time Calculation (min): 44 min    Therapy Charges for Today     Code Description Service Date Service Provider Modifiers Qty     [id number]  PT THER PROC EA 15 MIN 12/18/2017 Leroy Schroeder, PT GP 3                    Leroy Schroeder, PT  12/18/2017

## 2017-12-19 ENCOUNTER — PREP FOR SURGERY (OUTPATIENT)
Dept: OTHER | Facility: HOSPITAL | Age: 67
End: 2017-12-19

## 2017-12-19 DIAGNOSIS — Z80.0 FAMILY HISTORY OF COLON CANCER: Primary | ICD-10-CM

## 2017-12-19 DIAGNOSIS — Z86.010 HISTORY OF COLON POLYPS: ICD-10-CM

## 2017-12-20 PROBLEM — Z86.010 HISTORY OF COLON POLYPS: Status: ACTIVE | Noted: 2017-12-20

## 2017-12-20 PROBLEM — Z86.0100 HISTORY OF COLON POLYPS: Status: ACTIVE | Noted: 2017-12-20

## 2017-12-20 PROBLEM — Z80.0 FAMILY HISTORY OF COLON CANCER: Status: ACTIVE | Noted: 2017-12-20

## 2017-12-21 ENCOUNTER — HOSPITAL ENCOUNTER (OUTPATIENT)
Dept: PHYSICAL THERAPY | Facility: HOSPITAL | Age: 67
Setting detail: THERAPIES SERIES
Discharge: HOME | End: 2017-12-21

## 2017-12-21 DIAGNOSIS — M25.561 CHRONIC PAIN OF RIGHT KNEE: Primary | ICD-10-CM

## 2017-12-21 DIAGNOSIS — G89.29 CHRONIC PAIN OF RIGHT KNEE: Primary | ICD-10-CM

## 2017-12-21 DIAGNOSIS — Z74.09 IMPAIRED MOBILITY: ICD-10-CM

## 2017-12-21 PROCEDURE — 97110 THERAPEUTIC EXERCISES: CPT | Performed by: PHYSICAL THERAPIST

## 2017-12-21 NOTE — THERAPY TREATMENT NOTE
"    Outpatient Physical Therapy Ortho Treatment Note  [facility]     Patient Name: Marjorie MORRIS  : 1950  MRN: [id number]  Today's Date: 2017      Visit Date: 2017    Visit Dx:    ICD-10-CM ICD-9-CM   1. Chronic pain of right knee M25.561 719.46    G89.29 338.29   2. Impaired mobility Z74.09 799.89       Patient Active Problem List   Diagnosis   • Family history of colon cancer   • History of colon polyps        No past medical history on file.     Past Surgical History:   Procedure Laterality Date   •  SECTION     • HIP SURGERY Right    • TONSILLECTOMY                               PT Assessment/Plan       17 1436       PT Assessment    Assessment Comments Difficulty observed with R hip/knee flexion with placing leg on step. She reports difficulty with transition of RLE into car with \"same problem.\" Added hip flexor strengthening with TA, adductor strengthening with TA, and sarah sarah exercise. No increase in pain with additional exercises. Sarah Sarah used to assist with normalizing gait pattern. Patient reports one more therapy session to finalize HEP and then progress to independence in aqua/home environment.  -CK     PT Plan    PT Plan Comments Finalize HEP before discharge to self management.   -CK       User Key  (r) = Recorded By, (t) = Taken By, (c) = Cosigned By    Initials Name Provider Type    QUEENIE Leavitt, PT Physical Therapist                    Exercises       17 1400          Subjective Comments    Subjective Comments I joined [employer] but I have to make myself go. I am a little worked up today as I was cleanring a lot yesterday (mopping, sweeping, vacuuming). I feel like my movements are more fluid.   -CK      Subjective Pain    Able to rate subjective pain? yes  -CK      Pre-Treatment Pain Level 3  -CK      Exercise 1    Exercise Name 1 SL hip abd, bilaterally   -CK      Cueing 1 Demo  -CK      Reps 1 12  -CK      Exercise 2    Exercise Name 2 HR  -CK "      Cueing 2 Demo  -CK      Reps 2 20  -CK      Exercise 3    Exercise Name 3 standing HS curl  -CK      Cueing 3 Verbal  -CK      Sets 3 2  -CK      Reps 3 10  -CK      Additional Comments 2#  -CK      Exercise 4    Exercise Name 4 seated TKE into ball  -CK      Cueing 4 Verbal  -CK      Reps 4 10  -CK      Time (Seconds) 4 10  -CK      Exercise 5    Exercise Name 5 prone quad/hip flexor stretch with belt/sheet  -CK      Cueing 5 Verbal  -CK      Reps 5 3  -CK      Time (Seconds) 5 20  -CK      Exercise 6    Exercise Name 6 HS stretch (second step)  -CK      Cueing 6 Demo  -CK      Reps 6 2  -CK      Time (Seconds) 6 20  -CK      Exercise 7    Exercise Name 7 Nustep , L4  -CK      Cueing 7 Verbal  -CK      Time (Minutes) 7 5  -CK      Exercise 8    Exercise Name 8 standing hip abd, bilaterally  -CK      Cueing 8 Demo  -CK      Sets 8 2  -CK      Reps 8 10  -CK      Additional Comments 2#  -CK      Exercise 9    Exercise Name 9 gastroc stretch  -CK      Cueing 9 Demo  -CK      Reps 9 3  -CK      Time (Seconds) 9 30  -CK      Exercise 10    Exercise Name 10 QS  -CK      Cueing 10 Demo  -CK      Reps 10 15  -CK      Time (Seconds) 10 5  -CK      Exercise 11    Exercise Name 11 gayle gayle  -CK      Reps 11 15  -CK      Additional Comments hands on hips for balance challenge  -CK      Exercise 12    Exercise Name 12 MIP, with TA  -CK      Sets 12 2  -CK      Reps 12 10  -CK      Additional Comments 2#  -CK      Exercise 13    Exercise Name 13 Hooklying hip adduction  -CK      Reps 13 15  -CK      Time (Seconds) 13 5  -CK        User Key  (r) = Recorded By, (t) = Taken By, (c) = Cosigned By    Initials Name Provider Type    CK Art Leavitt PT Physical Therapist                             Therapy Education  Given: HEP  Program: Progressed  How Provided: Verbal  Provided to: Patient  Level of Understanding: Verbalized              Time Calculation:   Start Time: 1415  Stop Time: 1500  Time Calculation (min): 45  min    Therapy Charges for Today     Code Description Service Date Service Provider Modifiers Qty    [id number]  PT THER PROC EA 15 MIN 12/21/2017 Art Leavitt, PT GP 3                    Art Leavitt, PT  12/21/2017

## 2017-12-25 ENCOUNTER — APPOINTMENT (OUTPATIENT)
Dept: PHYSICAL THERAPY | Facility: HOSPITAL | Age: 67
End: 2017-12-25

## 2017-12-28 ENCOUNTER — HOSPITAL ENCOUNTER (OUTPATIENT)
Dept: PHYSICAL THERAPY | Facility: HOSPITAL | Age: 67
Setting detail: THERAPIES SERIES
Discharge: HOME | End: 2017-12-28

## 2017-12-28 DIAGNOSIS — G89.29 CHRONIC PAIN OF RIGHT KNEE: Primary | ICD-10-CM

## 2017-12-28 DIAGNOSIS — M25.561 CHRONIC PAIN OF RIGHT KNEE: Primary | ICD-10-CM

## 2017-12-28 DIAGNOSIS — Z74.09 IMPAIRED MOBILITY: ICD-10-CM

## 2017-12-28 PROCEDURE — 97110 THERAPEUTIC EXERCISES: CPT | Performed by: PHYSICAL THERAPIST

## 2017-12-28 NOTE — THERAPY DISCHARGE NOTE
Outpatient Physical Therapy Ortho Treatment Note/Discharge Summary  [facility]     Patient Name: Marjorie MORRIS  : 1950  MRN: [id number]  Today's Date: 2017      Visit Date: 2017    Visit Dx:    ICD-10-CM ICD-9-CM   1. Chronic pain of right knee M25.561 719.46    G89.29 338.29   2. Impaired mobility Z74.09 799.89       Patient Active Problem List   Diagnosis   • Family history of colon cancer   • History of colon polyps        No past medical history on file.     Past Surgical History:   Procedure Laterality Date   •  SECTION     • HIP SURGERY Right    • TONSILLECTOMY                                       Exercises       17 1100          Subjective Pain    Able to rate subjective pain? yes  -CK      Pre-Treatment Pain Level 2  -CK      Exercise 2    Exercise Name 2 HR  -CK      Cueing 2 Demo  -CK      Reps 2 20  -CK      Exercise 3    Exercise Name 3 standing HS curl  -CK      Cueing 3 Verbal  -CK      Sets 3 2  -CK      Reps 3 10  -CK      Additional Comments 3#  -CK      Exercise 4    Exercise Name 4 seated TKE into ball  -CK      Cueing 4 Verbal  -CK      Reps 4 10  -CK      Time (Seconds) 4 10  -CK      Exercise 6    Exercise Name 6 HS stretch (second step)  -CK      Cueing 6 Demo  -CK      Reps 6 2  -CK      Time (Seconds) 6 30  -CK      Exercise 7    Exercise Name 7 Nustep , L4  -CK      Cueing 7 Verbal  -CK      Time (Minutes) 7 5  -CK      Exercise 8    Exercise Name 8 standing hip abd, bilaterally  -CK      Cueing 8 Demo  -CK      Reps 8 15  -CK      Additional Comments 3#  -CK      Exercise 9    Exercise Name 9 gastroc stretch  -CK      Cueing 9 Demo  -CK      Reps 9 3  -CK      Time (Seconds) 9 30  -CK      Exercise 10    Exercise Name 10 resisted sidestepping  -CK      Additional Comments 2 laps, YTB  -CK      Exercise 12    Exercise Name 12 MIP, with TA  -CK      Sets 12 2  -CK      Reps 12 10  -CK      Additional Comments 3#  -CK      Exercise 13     Exercise Name 13 Hooklying hip adduction  -CK      Reps 13 15  -CK      Time (Seconds) 13 5  -CK        User Key  (r) = Recorded By, (t) = Taken By, (c) = Cosigned By    Initials Name Provider Type    QUEENIE Leavitt PT Physical Therapist                               PT OP Goals       12/28/17 1100       PT Short Term Goals    STG Date to Achieve 01/05/18  -CK     STG 1 pt. to be I with initial HEP to facilitate self management of their condition  -CK     STG 1 Progress Met  -CK     STG 2 pt. to be educated in/verbalize understanding of the importance of posture/ergonomics in association with their condition to facilitate self management of their condition  -CK     STG 2 Progress Met  -CK     Long Term Goals    LTG Date to Achieve 02/02/18  -CK     LTG 1 pt. to be I with advanced HEP to facilitate self management of their condition  -CK     LTG 1 Progress Met  -CK     LTG 1 Progress Comments Comprehensive handouts given. Reviewed all prior to end of session  -CK     LTG 2 pt to demonstrate R knee AROM extension bettern then or equal to 8 degrees short of neutral to faciliate ease/safety with gait  -CK     LTG 2 Progress Partially Met  -CK     LTG 3 pt to ascend/descend stairs reciprocally with </= 1 rail to facilitate ease/safety with household/community mobility  -CK     LTG 3 Progress Met  -CK     LTG 4 pt to demonstrate near normal heel to toe gait pattern (with/without AD) to facilitate ease/safety with community mobility  -CK     LTG 4 Progress Partially Met  -CK     LTG 4 Progress Comments slight antalgic gait pattern especially with colder weather or fatigue but overall improved stride length from initial evaluation  -CK       User Key  (r) = Recorded By, (t) = Taken By, (c) = Cosigned By    Initials Name Provider Type    QUEENIE Leavitt PT Physical Therapist                         Time Calculation:   Start Time: 1100  Stop Time: 1145  Time Calculation (min): 45 min    Therapy Charges for Today     Code  Description Service Date Service Provider Modifiers Qty    11684837506 HC PT THER PROC EA 15 MIN 12/28/2017 Art Leavitt, PT GP 3                OP PT Discharge Summary  Date of Discharge: 12/28/17  Reason for Discharge: Maximum functional potential achieved, Independent  Outcomes Achieved: Patient able to partially acheive established goals  Discharge Destination: Home with home program  Discharge Instructions: Ms. Mejia was seen for 8 skilled therapy sessions (4 aqua/4 land) to establish a HEP she can perform independently at the gym. She is capable of self management at this time with assistance from comprehensive handouts. She has met or near met her goals and is ready to start her independent exercise program at this time. Plan to discharge.       Art Leavitt, PT  12/28/2017

## 2018-01-02 ENCOUNTER — APPOINTMENT (OUTPATIENT)
Dept: PHYSICAL THERAPY | Facility: HOSPITAL | Age: 68
End: 2018-01-02

## 2018-01-04 ENCOUNTER — APPOINTMENT (OUTPATIENT)
Dept: PHYSICAL THERAPY | Facility: HOSPITAL | Age: 68
End: 2018-01-04

## 2018-05-23 ENCOUNTER — TRANSCRIBE ORDERS (OUTPATIENT)
Dept: ADMINISTRATIVE | Facility: HOSPITAL | Age: 68
End: 2018-05-23

## 2018-05-23 DIAGNOSIS — E04.1 THYROID NODULE: Primary | ICD-10-CM

## 2018-05-29 ENCOUNTER — HOSPITAL ENCOUNTER (OUTPATIENT)
Dept: ULTRASOUND IMAGING | Facility: HOSPITAL | Age: 68
Discharge: HOME | End: 2018-05-29
Attending: INTERNAL MEDICINE | Admitting: INTERNAL MEDICINE

## 2018-05-29 DIAGNOSIS — E04.1 THYROID NODULE: ICD-10-CM

## 2018-05-29 PROCEDURE — 76536 US EXAM OF HEAD AND NECK: CPT

## 2019-03-25 ENCOUNTER — PREP FOR SURGERY (OUTPATIENT)
Dept: OTHER | Facility: HOSPITAL | Age: 69
End: 2019-03-25

## 2019-03-25 DIAGNOSIS — Z86.010 PERSONAL HISTORY OF COLONIC POLYPS: Primary | ICD-10-CM

## 2019-03-25 RX ORDER — SODIUM CHLORIDE, SODIUM LACTATE, POTASSIUM CHLORIDE, CALCIUM CHLORIDE 600; 310; 30; 20 MG/100ML; MG/100ML; MG/100ML; MG/100ML
30 INJECTION, SOLUTION INTRAVENOUS CONTINUOUS
Status: CANCELLED | OUTPATIENT
Start: 2019-05-14

## 2019-04-15 PROBLEM — Z86.010 PERSONAL HISTORY OF COLONIC POLYPS: Status: ACTIVE | Noted: 2019-04-15

## 2019-04-15 PROBLEM — Z86.0100 PERSONAL HISTORY OF COLONIC POLYPS: Status: ACTIVE | Noted: 2019-04-15

## 2019-05-14 ENCOUNTER — HOSPITAL ENCOUNTER (OUTPATIENT)
Facility: HOSPITAL | Age: 69
Setting detail: HOSPITAL OUTPATIENT SURGERY
Discharge: HOME | End: 2019-05-14
Attending: INTERNAL MEDICINE | Admitting: INTERNAL MEDICINE

## 2019-05-14 ENCOUNTER — ANESTHESIA EVENT (OUTPATIENT)
Dept: GASTROENTEROLOGY | Facility: HOSPITAL | Age: 69
End: 2019-05-14

## 2019-05-14 ENCOUNTER — ANESTHESIA (OUTPATIENT)
Dept: GASTROENTEROLOGY | Facility: HOSPITAL | Age: 69
End: 2019-05-14

## 2019-05-14 VITALS
DIASTOLIC BLOOD PRESSURE: 78 MMHG | RESPIRATION RATE: 16 BRPM | SYSTOLIC BLOOD PRESSURE: 130 MMHG | OXYGEN SATURATION: 98 % | WEIGHT: 202.31 LBS | BODY MASS INDEX: 32.65 KG/M2 | TEMPERATURE: 98.1 F | HEART RATE: 64 BPM

## 2019-05-14 DIAGNOSIS — Z86.010 PERSONAL HISTORY OF COLONIC POLYPS: ICD-10-CM

## 2019-05-14 PROCEDURE — 25010000002 PROPOFOL 10 MG/ML EMULSION: Performed by: ANESTHESIOLOGY

## 2019-05-14 PROCEDURE — 88305 TISSUE EXAM BY PATHOLOGIST: CPT | Performed by: INTERNAL MEDICINE

## 2019-05-14 PROCEDURE — 45385 COLONOSCOPY W/LESION REMOVAL: CPT | Performed by: INTERNAL MEDICINE

## 2019-05-14 RX ORDER — PROPOFOL 10 MG/ML
VIAL (ML) INTRAVENOUS CONTINUOUS PRN
Status: DISCONTINUED | OUTPATIENT
Start: 2019-05-14 | End: 2019-05-14 | Stop reason: SURG

## 2019-05-14 RX ORDER — SODIUM CHLORIDE, SODIUM LACTATE, POTASSIUM CHLORIDE, CALCIUM CHLORIDE 600; 310; 30; 20 MG/100ML; MG/100ML; MG/100ML; MG/100ML
30 INJECTION, SOLUTION INTRAVENOUS CONTINUOUS
Status: DISCONTINUED | OUTPATIENT
Start: 2019-05-14 | End: 2019-05-14 | Stop reason: HOSPADM

## 2019-05-14 RX ORDER — PROPOFOL 10 MG/ML
VIAL (ML) INTRAVENOUS AS NEEDED
Status: DISCONTINUED | OUTPATIENT
Start: 2019-05-14 | End: 2019-05-14 | Stop reason: SURG

## 2019-05-14 RX ADMIN — SODIUM CHLORIDE, POTASSIUM CHLORIDE, SODIUM LACTATE AND CALCIUM CHLORIDE 30 ML/HR: 600; 310; 30; 20 INJECTION, SOLUTION INTRAVENOUS at 07:55

## 2019-05-14 RX ADMIN — PROPOFOL 150 MG: 10 INJECTION, EMULSION INTRAVENOUS at 08:23

## 2019-05-14 RX ADMIN — PROPOFOL 140 MCG/KG/MIN: 10 INJECTION, EMULSION INTRAVENOUS at 08:24

## 2019-05-14 NOTE — ANESTHESIA POSTPROCEDURE EVALUATION
Patient: Marjorie Ramires    Procedure Summary     Date:  05/14/19 Room / Location:  [facility]  [facility]    Anesthesia Start:  0821 Anesthesia Stop:  0847    Procedure:  COLONOSCOPY TO CECUM WITH POLYPECTOMY COLD SNARE (N/A ) Diagnosis:       Personal history of colonic polyps      (Personal history of colonic polyps [Z86.010])    Surgeon:  Waldemar Valadez MD Provider:  Nito Patel MD    Anesthesia Type:  MAC ASA Status:  2          Anesthesia Type: MAC  Last vitals  BP   121/66 (05/14/19 0845)   Temp   36.7 °C (98.1 °F) (05/14/19 0845)   Pulse   64 (05/14/19 0845)   Resp   14 (05/14/19 0845)     SpO2   98 % (05/14/19 0845)     Post Anesthesia Care and Evaluation    Patient location during evaluation: bedside  Patient participation: complete - patient participated  Level of consciousness: awake and alert  Pain management: adequate  Airway patency: patent  Anesthetic complications: No anesthetic complications    Cardiovascular status: acceptable  Respiratory status: acceptable  Hydration status: acceptable    Comments: /66 (BP Location: Left arm, Patient Position: Lying)   Pulse 64   Temp 36.7 °C (98.1 °F) (Oral)   Resp 14   Wt 91.8 kg (202 lb 5 oz)   SpO2 98%   BMI 32.65 kg/m²

## 2019-05-14 NOTE — DISCHARGE INSTRUCTIONS
For the next 24 hours patient needs to be with a responsible adult.    For 24 hours DO NOT drive, operate machinery, appliances, drink alcohol, make important decisions or sign legal documents.    Start with a light or bland diet if you are feeling sick to your stomach otherwise advance to regular diet as tolerated.    Follow recommendations on procedure report if provided by your doctor.    Call Dr Valadez for problems [phone]    Problems may include but not limited to: large amounts of bleeding, trouble breathing, repeated vomiting, severe unrelieved pain, fever or chills.

## 2019-05-14 NOTE — H&P
[facility]  Pre Procedure History & Physical    Chief Complaint:   Time for my colonoscopy    Subjective     HPI:   69 y.o. female here for surveillance colonoscopy.  Cscope 2012 with 5mm ascending colon polyp.  No current Gi complaints.      Past Medical History:   Past Medical History:   Diagnosis Date   • Arthritis    • Cancer (CMS/HCC)    • Disease of thyroid gland    • Murmur    • Osteoarthritis        Family History:  Family History   Problem Relation Age of Onset   • Colon cancer Father        Social History:   reports that she has never smoked. She does not have any smokeless tobacco history on file. She reports that she drinks alcohol. She reports that she does not use drugs.    Medications:   Medications Prior to Admission   Medication Sig Dispense Refill Last Dose   • acetaminophen (TYLENOL) 650 MG 8 hr tablet Take 650 mg by mouth Every 8 (Eight) Hours As Needed for Mild Pain .   Past Week at Unknown time   • calcium carbonate (OS-RAMAN) 600 MG tablet Take 600 mg by mouth 2 (Two) Times a Day With Meals.   5/13/2019 at Unknown time   • levothyroxine (SYNTHROID) 25 MCG tablet Take  by mouth Daily.   5/13/2019 at Unknown time   • Multiple Minerals-Vitamins (ADVANCED CALCIUM/D/MAGNESIUM) tablet Take  by mouth.   5/13/2019 at Unknown time       Allergies:  Sulfa antibiotics    ROS:    Pertinent items are noted in HPI     Objective     Blood pressure 137/83, pulse 62, temperature 98.3 °F (36.8 °C), temperature source Oral, resp. rate 12, weight 91.8 kg (202 lb 5 oz), SpO2 97 %.    Physical Exam   Constitutional: Pt is oriented to person, place, and time and well-developed, well-nourished, and in no distress.   HENT:   Mouth/Throat: Oropharynx is clear and moist.   Neck: Normal range of motion. Neck supple.   Cardiovascular: Normal rate, regular rhythm and normal heart sounds.    Pulmonary/Chest: Effort normal and breath sounds normal. No respiratory distress. No  wheezes.   Abdominal:  Soft. Bowel sounds are normal.   Skin: Skin is warm and dry.   Psychiatric: Mood, memory, affect and judgment normal.     Assessment/Plan     Diagnosis:  Personal hx of colon polyps    Anticipated Surgical Procedure:  Colonoscopy    The risks, benefits, and alternatives of this procedure have been discussed with the patient or the responsible party- the patient understands and agrees to proceed.

## 2019-05-14 NOTE — ANESTHESIA PREPROCEDURE EVALUATION
Anesthesia Evaluation     Patient summary reviewed and Nursing notes reviewed   no history of anesthetic complications:               Airway   Mallampati: II  TM distance: >3 FB  Neck ROM: full  no difficulty expected  Dental - normal exam     Pulmonary - negative pulmonary ROS    breath sounds clear to auscultation  (-) shortness of breath, sleep apnea, decreased breath sounds, wheezes  Cardiovascular - normal exam  Exercise tolerance: good (4-7 METS)    Rhythm: regular  Rate: normal    (+) valvular problems/murmurs murmur,   (-) past MI, angina, CHF, orthopnea, PND, VASQUEZ, PVD      Neuro/Psych- negative ROS  (-) seizures, neuromuscular disease, TIA, CVA, dizziness/light headedness, weakness, numbness  GI/Hepatic/Renal/Endo    (+)   hypothyroidism,   (-) liver disease, diabetes    Musculoskeletal     Abdominal  - normal exam   Substance History - negative use  (-) alcohol use, drug use     OB/GYN negative ob/gyn ROS         Other   (+) arthritis                     Anesthesia Plan    ASA 2     MAC     intravenous induction   Anesthetic plan, all risks, benefits, and alternatives have been provided, discussed and informed consent has been obtained with: patient.

## 2019-05-15 LAB
CYTO UR: NORMAL
LAB AP CASE REPORT: NORMAL
PATH REPORT.FINAL DX SPEC: NORMAL
PATH REPORT.GROSS SPEC: NORMAL

## 2019-05-16 NOTE — PROGRESS NOTES
The polyp(s) biopsies showed adenomatous change. This is not cancerous but is considered potentially precancerous. Follow-up colonoscopy in 5 years is advised.

## 2019-05-28 ENCOUNTER — TELEPHONE (OUTPATIENT)
Dept: GASTROENTEROLOGY | Facility: CLINIC | Age: 69
End: 2019-05-28

## 2019-05-28 NOTE — TELEPHONE ENCOUNTER
----- Message from Waldemar Valadez MD sent at 5/16/2019  4:31 PM EDT -----  The polyp(s) biopsies showed adenomatous change. This is not cancerous but is considered potentially precancerous. Follow-up colonoscopy in 5 years is advised.

## 2019-05-28 NOTE — TELEPHONE ENCOUNTER
Pt returned call per a staff message.    Called pt back. Advised that per Dr Valadez: the polyps removed during her colonoscopy showed adenomatous change. This is not cancerous but is considered potentially precancerous. Advised that MD recommends to repeat the colonoscopy in 5 years. Pt verb understanding.

## 2019-06-28 ENCOUNTER — TRANSCRIBE ORDERS (OUTPATIENT)
Dept: ADMINISTRATIVE | Facility: HOSPITAL | Age: 69
End: 2019-06-28

## 2019-08-09 ENCOUNTER — APPOINTMENT (OUTPATIENT)
Dept: WOMENS IMAGING | Facility: HOSPITAL | Age: 69
End: 2019-08-09

## 2019-08-09 PROCEDURE — 77063 BREAST TOMOSYNTHESIS BI: CPT | Performed by: RADIOLOGY

## 2019-08-09 PROCEDURE — 77067 SCR MAMMO BI INCL CAD: CPT | Performed by: RADIOLOGY

## 2019-09-05 ENCOUNTER — TRANSCRIBE ORDERS (OUTPATIENT)
Dept: ADMINISTRATIVE | Facility: HOSPITAL | Age: 69
End: 2019-09-05

## 2019-09-05 DIAGNOSIS — E04.1 NONTOXIC SINGLE THYROID NODULE: Primary | ICD-10-CM

## 2019-09-06 ENCOUNTER — OFFICE VISIT (OUTPATIENT)
Dept: ORTHOPEDIC SURGERY | Facility: CLINIC | Age: 69
End: 2019-09-06

## 2019-09-06 VITALS — WEIGHT: 205 LBS | HEIGHT: 65 IN | BODY MASS INDEX: 34.16 KG/M2 | TEMPERATURE: 98.2 F

## 2019-09-06 DIAGNOSIS — M17.11 PRIMARY OSTEOARTHRITIS OF RIGHT KNEE: Primary | ICD-10-CM

## 2019-09-06 DIAGNOSIS — M25.562 LEFT KNEE PAIN, UNSPECIFIED CHRONICITY: ICD-10-CM

## 2019-09-06 PROCEDURE — 20610 DRAIN/INJ JOINT/BURSA W/O US: CPT | Performed by: ORTHOPAEDIC SURGERY

## 2019-09-06 PROCEDURE — 99213 OFFICE O/P EST LOW 20 MIN: CPT | Performed by: ORTHOPAEDIC SURGERY

## 2019-09-06 PROCEDURE — 73562 X-RAY EXAM OF KNEE 3: CPT | Performed by: ORTHOPAEDIC SURGERY

## 2019-09-06 RX ORDER — METHYLPREDNISOLONE ACETATE 80 MG/ML
80 INJECTION, SUSPENSION INTRA-ARTICULAR; INTRALESIONAL; INTRAMUSCULAR; SOFT TISSUE
Status: COMPLETED | OUTPATIENT
Start: 2019-09-06 | End: 2019-09-06

## 2019-09-06 RX ADMIN — METHYLPREDNISOLONE ACETATE 80 MG: 80 INJECTION, SUSPENSION INTRA-ARTICULAR; INTRALESIONAL; INTRAMUSCULAR; SOFT TISSUE at 16:40

## 2019-09-06 NOTE — PROGRESS NOTES
"Patient: Marjorie Curtis  YOB: 1950 69 y.o. female  Medical Record Number: [id number]    Chief Complaints:   Chief Complaint   Patient presents with   • Left Knee - Establish Care       History of Present Illness:Marjorie Curtis is a 69 y.o. female who presents for follow-up of left knee.  She is having increasing pain about the medial aspect of the left knee.  She was on her knee gardening and shortly thereafter she developed pain which she described as a moderate to severe ache.  Worse with activity.  This started about 3 to 4 weeks ago.    Allergies:   Allergies   Allergen Reactions   • Sulfa Antibiotics        Medications:   Current Outpatient Medications   Medication Sig Dispense Refill   • acetaminophen (TYLENOL) 650 MG 8 hr tablet Take 650 mg by mouth Every 8 (Eight) Hours As Needed for Mild Pain .     • calcium carbonate (OS-RAMAN) 600 MG tablet Take 600 mg by mouth 2 (Two) Times a Day With Meals.     • Flaxseed, Linseed, (FLAXSEED OIL PO) Take  by mouth.     • levothyroxine (SYNTHROID) 25 MCG tablet Take  by mouth Daily.     • Multiple Minerals-Vitamins (ADVANCED CALCIUM/D/MAGNESIUM) tablet Take  by mouth.       No current facility-administered medications for this visit.          The following portions of the patient's history were reviewed and updated as appropriate: allergies, current medications, past family history, past medical history, past social history, past surgical history and problem list.    Review of Systems:   A 14 point review of systems was performed. All systems negative except pertinent positives/negative listed in HPI above    Physical Exam:   Vitals:    09/06/19 1553   Temp: 98.2 °F (36.8 °C)   Weight: 93 kg (205 lb)   Height: 165.1 cm (65\")       General: A and O x 3, ASA, NAD    SCLERA:    Normal    DENTITION:   Normal  Knee:  left    ALIGNMENT:     Neutral  ,   Patella tracks   midline    GAIT:    Antalgic    SKIN:    No abnormality    RANGE OF MOTION:   Painful " flexion    STRENGTH:   5 / 5    LIGAMENTS:    No varus / valgus instability.   Negative  Lachman.    MENISCUS:     Positive  medial   Vladimir       DISTAL PULSES:    Paplable    DISTAL SENSATION :   Intact    LYMPHATICS:     No   lymphadenopathy    OTHER:          - Positive  effusion      - No crepitance with ROM       Radiology:  Xrays 3views left knee (ap,lateral, sunrise) were ordered and reviewed for evaluation of knee pain demonstratingmoderate joint space narrowing  todays xrays were compared to previous xrays and demonstrate no change    Assessment/Plan:  Left knee OA with flare up. Injected as below.  Large Joint Arthrocentesis: L knee  Date/Time: 9/6/2019 4:40 PM  Consent given by: patient  Site marked: site marked  Timeout: Immediately prior to procedure a time out was called to verify the correct patient, procedure, equipment, support staff and site/side marked as required   Supporting Documentation  Indications: pain and joint swelling   Procedure Details  Location: knee - L knee  Preparation: Patient was prepped and draped in the usual sterile fashion  Needle gauge: 21.  Approach: anterolateral  Medications administered: 80 mg methylPREDNISolone acetate 80 MG/ML; 2 mL lidocaine (cardiac)  Patient tolerance: patient tolerated the procedure well with no immediate complications

## 2019-10-18 ENCOUNTER — HOSPITAL ENCOUNTER (OUTPATIENT)
Dept: ULTRASOUND IMAGING | Facility: HOSPITAL | Age: 69
Discharge: HOME | End: 2019-10-18
Admitting: INTERNAL MEDICINE

## 2019-10-18 DIAGNOSIS — E04.1 NONTOXIC SINGLE THYROID NODULE: ICD-10-CM

## 2019-10-18 PROCEDURE — 76536 US EXAM OF HEAD AND NECK: CPT

## 2021-03-22 ENCOUNTER — BULK ORDERING (OUTPATIENT)
Dept: CASE MANAGEMENT | Facility: OTHER | Age: 71
End: 2021-03-22

## 2021-03-22 DIAGNOSIS — Z23 IMMUNIZATION DUE: ICD-10-CM

## 2022-07-19 ENCOUNTER — OFFICE VISIT (OUTPATIENT)
Dept: ORTHOPEDIC SURGERY | Facility: CLINIC | Age: 72
End: 2022-07-19

## 2022-07-19 VITALS — WEIGHT: 207 LBS | TEMPERATURE: 97.8 F | HEIGHT: 67 IN | BODY MASS INDEX: 32.49 KG/M2

## 2022-07-19 DIAGNOSIS — M16.12 PRIMARY OSTEOARTHRITIS OF LEFT HIP: Primary | ICD-10-CM

## 2022-07-19 PROCEDURE — 99214 OFFICE O/P EST MOD 30 MIN: CPT | Performed by: NURSE PRACTITIONER

## 2022-07-19 PROCEDURE — 73501 X-RAY EXAM HIP UNI 1 VIEW: CPT | Performed by: NURSE PRACTITIONER

## 2022-07-19 RX ORDER — POVIDONE-IODINE 10 MG/ML
SOLUTION TOPICAL ONCE
Status: CANCELLED | OUTPATIENT
Start: 2022-10-10 | End: 2022-07-19

## 2022-07-19 RX ORDER — CEFAZOLIN SODIUM 2 G/100ML
2 INJECTION, SOLUTION INTRAVENOUS ONCE
Status: CANCELLED | OUTPATIENT
Start: 2022-10-10 | End: 2022-07-19

## 2022-07-19 RX ORDER — ERGOCALCIFEROL 1.25 MG/1
50000 CAPSULE ORAL WEEKLY
COMMUNITY
Start: 2022-07-11

## 2022-07-19 RX ORDER — CLOTRIMAZOLE 1 %
CREAM (GRAM) TOPICAL
COMMUNITY
Start: 2022-06-24

## 2022-07-19 RX ORDER — MELOXICAM 15 MG/1
15 TABLET ORAL ONCE
Status: CANCELLED | OUTPATIENT
Start: 2022-10-10 | End: 2022-07-19

## 2022-07-19 RX ORDER — CEFAZOLIN SODIUM IN 0.9 % NACL 3 G/100 ML
3 INTRAVENOUS SOLUTION, PIGGYBACK (ML) INTRAVENOUS ONCE
Status: CANCELLED | OUTPATIENT
Start: 2022-10-10 | End: 2022-07-19

## 2022-07-19 RX ORDER — LOSARTAN POTASSIUM 50 MG/1
50 TABLET ORAL DAILY
COMMUNITY
Start: 2022-06-24

## 2022-07-19 RX ORDER — MELATONIN
1000 DAILY
COMMUNITY

## 2022-07-19 RX ORDER — PREGABALIN 75 MG/1
150 CAPSULE ORAL ONCE
Status: CANCELLED | OUTPATIENT
Start: 2022-10-10 | End: 2022-07-19

## 2022-07-19 RX ORDER — CHLORHEXIDINE GLUCONATE 500 MG/1
CLOTH TOPICAL 2 TIMES DAILY
Status: CANCELLED | OUTPATIENT
Start: 2022-07-19

## 2022-07-19 NOTE — PROGRESS NOTES
Patient: Marjorie Curtis  YOB: 1950 72 y.o. female  Medical Record Number: [id number]    Chief Complaints:   Chief Complaint   Patient presents with   • Left Hip - Pain     New problem          History of Present Illness:Marjorie Curtis is a 72 y.o. female who presents with complaints of having left hip pain that is chronic in nature.  Patient states that she has been hurting for over a year with it worsening over the last few months.  Patient states the pain is located to the posterior lateral aspect of her hip and radiates inward.  Patient describes her pain as a moderate to severe ache and states that it is daily and constant.  She states her pain level is about a 6 or 7 out of 10 and can worsen with certain positions.  Patient states it is affecting her quality of life and now she is having to use a cane for ambulatory purposes.  Patient had a previous right total hip by Dr. Sosa 2013, orts that this pain feels very similar to the pain she had when she had her hip replaced.  Patient states that she is been taking Tylenol arthritis for pain with little to no relief.  She denies any signs or symptoms of infection.  Patient is here today for further evaluation.    Allergies:   Allergies   Allergen Reactions   • Sulfa Antibiotics        Medications:   Current Outpatient Medications   Medication Sig Dispense Refill   • acetaminophen (TYLENOL) 650 MG 8 hr tablet Take 650 mg by mouth Every 8 (Eight) Hours As Needed for Mild Pain .     • calcium carbonate (OS-RAMAN) 600 MG tablet Take 600 mg by mouth 2 (Two) Times a Day With Meals.     • cholecalciferol (VITAMIN D3) 25 MCG (1000 UT) tablet Take 1,000 Units by mouth Daily.     • clotrimazole (LOTRIMIN) 1 % cream APPLY TO THE AFFECTED AREA SURROUNDING AREAS OF SKIN TOPICALLY TWICE DAILY IN THE MORNING AND EVENING     • Flaxseed, Linseed, (FLAXSEED OIL PO) Take  by mouth.     • levothyroxine (SYNTHROID, LEVOTHROID) 25 MCG tablet Take  by mouth Daily.     •  "losartan (COZAAR) 50 MG tablet Take 50 mg by mouth Daily.     • Multiple Minerals-Vitamins (ADVANCED CALCIUM/D/MAGNESIUM) tablet Take  by mouth.     • vitamin D (ERGOCALCIFEROL) 1.25 MG (66106 UT) capsule capsule Take 50,000 Units by mouth 1 (One) Time Per Week.       No current facility-administered medications for this visit.         The following portions of the patient's history were reviewed and updated as appropriate: allergies, current medications, past family history, past medical history, past social history, past surgical history and problem list.    Review of Systems:   A 14 point review of systems was performed. All systems negative except pertinent positives/negative listed in HPI above    Physical Exam:   Vitals:    07/19/22 1459   Temp: 97.8 °F (36.6 °C)   Weight: 93.9 kg (207 lb)   Height: 170.2 cm (67\")       General: A and O x 3, ASA, NAD    SCLERA:    Normal    DENTITION:   Normal    Hip:  left    LEG ALIGNMENT:     Neutral        LEG LENGTH DISCREPANCY   :    none    GAIT:     Antalgic    SKIN:     No abnormality    RANGE OF MOTION:     Limited by joint irritability    STRENGTH:     Limited by joint irratibility    DISTAL PULSES:    Paplable    DISTAL SENSATION :   Intact    LYMPHATICS:     No   lymphadenopathy    OTHER:          +   Stinchfeld test      -    log roll      -   Tenderness to palpation trochanteric bursa          Radiology:  Xrays 2views (AP bilateral hips, and lateral of the hip) ordered and reviewed for evaluation of hip pain  demonstrating  advanced, end-satge osteoarthritis with bone on bone articluation, periarticular osteophytes, and subchondral cysts as well as impingement morphology.  No other hip x-rays were available today for comparison purposes.    Assessment/Plan: Primary osteoarthritis left hip    Continuation of conservative management vs. BRIAN discussed.  The patient wishes to proceed with total hip replacement.  At this point the patient has failed the full gamut of " conservative treatment and stating complete understanding of the risks/benefits/ anternatives wishes to proceed with surgical treatment.    Risk and benefits of surgery were reviewed.  Including, but not limited to, blood clots, anesthesia risk, infection, leg length discrepancy, fracture, skin/leg numbness, failure of the implant, need for future surgeries, continued pain, hematoma, need for transfusion, and death, among others.  The patient understands and wishes to proceed.     The spectrum of treatment options were discussed with the patient in detail including both the nonoperative and operative treatment modalities and their respective risks and benefits.  After thorough discussion, the patient has elected to undergo surgical treatment.  The details of the surgical procedure were explained including the location of probable incisions and a description of the likely implants to be used.  Models and diagrams were used as educational resources. The patient understands the likely convalescence after surgery, as well as the rehabilitation required.  We thoroughly discussed the risks, benefits, and alternatives to surgery.  The risks include but are not limited to the risk of infection, joint stiffness, blood clots (including DVT and/or pulmonary embolus along with the risk of death), neurologic and/or vascular injury, fracture, dislocation, nonunion, malunion, need for further surgery including hardware failure requiring revision, and continued pain.  It was explained that if tissue has been repaired or reconstructed, there is also a chance of failure which may require further management.  Following the completion of the discussion, the patient expressed understanding of this planned course of care, all their questions were answered and consent will be obtained preoperatively.    Operative Plan: Anterior approach Total Hip Replacement - Outpatient.  Patient is medically optimized as she only has high blood pressure  and hypothyroidism.  She has a Body mass index is 32.42 kg/m².  Would be a good candidate for same-day with home health.        Miguel Grady, APRN  7/19/2022

## 2022-07-25 PROBLEM — M16.12 PRIMARY OSTEOARTHRITIS OF LEFT HIP: Status: ACTIVE | Noted: 2022-07-25

## 2022-09-29 ENCOUNTER — IMMUNIZATION (OUTPATIENT)
Dept: VACCINE CLINIC | Facility: HOSPITAL | Age: 72
End: 2022-09-29

## 2022-09-29 ENCOUNTER — PRE-ADMISSION TESTING (OUTPATIENT)
Dept: PREADMISSION TESTING | Facility: HOSPITAL | Age: 72
End: 2022-09-29

## 2022-09-29 VITALS
RESPIRATION RATE: 18 BRPM | DIASTOLIC BLOOD PRESSURE: 95 MMHG | BODY MASS INDEX: 32.05 KG/M2 | HEART RATE: 74 BPM | SYSTOLIC BLOOD PRESSURE: 139 MMHG | WEIGHT: 204.2 LBS | TEMPERATURE: 98 F | HEIGHT: 67 IN | OXYGEN SATURATION: 97 %

## 2022-09-29 DIAGNOSIS — M16.12 PRIMARY OSTEOARTHRITIS OF LEFT HIP: ICD-10-CM

## 2022-09-29 DIAGNOSIS — Z23 NEED FOR VACCINATION: Primary | ICD-10-CM

## 2022-09-29 PROCEDURE — 91312 HC SARSCOV2 VAC 30MCG/0.3ML IM BIVALENT BOOSTER 12 YRS AND OLDER: CPT | Performed by: INTERNAL MEDICINE

## 2022-09-29 PROCEDURE — 0124A: CPT | Performed by: INTERNAL MEDICINE

## 2022-09-29 RX ORDER — CHLORHEXIDINE GLUCONATE 500 MG/1
CLOTH TOPICAL 2 TIMES DAILY
Status: ACTIVE | OUTPATIENT
Start: 2022-09-29

## 2022-09-29 RX ORDER — CHLORHEXIDINE GLUCONATE 500 MG/1
CLOTH TOPICAL
COMMUNITY
End: 2022-10-10 | Stop reason: HOSPADM

## 2022-09-29 ASSESSMENT — HOOS JR
HOOS JR SCORE: 14
HOOS JR SCORE: 52.465

## 2022-10-03 ENCOUNTER — TELEPHONE (OUTPATIENT)
Dept: ORTHOPEDIC SURGERY | Facility: HOSPITAL | Age: 72
End: 2022-10-03

## 2022-10-03 NOTE — TELEPHONE ENCOUNTER
Risk Factor yes no   Age >75  X   BMI <20 >40  X   Patient History     Chronic Pain (2 or more meds/Pain Management)  X   ETOH (more than 3 drinks Daily)  X   Uncontrolled Depression/Bipolar/Schizoaffective Disorder  X   Arrhythmias  X   Stent placement/MI  X   DVT/PE  X   Pacemaker  X   HTN (uncontrolled or requiring more than 2 medications)  X   CHF/Retained fluids/Edema  X   Stroke with Residual   X   COPD/Asthma  X   LAURIE--Non-compliant with CPAP  X   Diabetes (on insulin or more than 2 meds)         A1C:   X   BPH/Urinary retention (on medication)  X   CKD  X   Home environment and support     Current ambulation status (use of cane, walker, W/C, Multiple falls/weakness) X    Stairs to enter and throughout home X    Lives Alone  X   Doesn’t have support at home  X     Outpatient Screening Assessment    Home needs: (Walker/BSC):  Requesting BSC  ? Steps 3;3;2 into house   Caregiver 24-48hrs post-discharge: Home with      Discharge Plan:     Prescriptions: Meds to bed    Home medications:   ? Blood thinner/anti-coag therapy--  ? BPH or diuretic--  ? BP meds-- Losartan    ? Pain/Anti-inflammatories--   Pre-op Education:  Educate patient on spinal anesthesia/pain control:  ? patient verbalize understanding    Educate patient on hospital course/timeline:  ?  patient verbalize understanding    Joint Care Class:  ?  yes ? no      Notes:   Uses a cane to get around normally.

## 2022-10-05 NOTE — DISCHARGE PLACEMENT REQUEST
"Marjorie Curtis (72 y.o. Female)             Date of Birth   1950    Social Security Number       Address   [facility] [address] [place] [address]    Home Phone   [phone]    MRN   [id number]       Rastafari   None    Marital Status                               Admission Date       Admission Type   Elective    Admitting Provider   Miguel Sosa MD    Attending Provider   Miguel Sosa MD    Department, Room/Bed   [facility] OR, --/--       Discharge Date       Discharge Disposition       Discharge Destination                               Attending Provider: Miguel Sosa MD    Allergies: Sulfa Antibiotics    Isolation: None   Infection: COVID Screen (preop/placement) (07/19/22)   Code Status: Not on file   Advance Care Planning Activity    Ht: 170.2 cm (67\")   Wt: 92.6 kg (204 lb 3.2 oz)    Admission Cmt: None   Principal Problem: Primary osteoarthritis of left hip [M16.12] More...                 Active Insurance as of 10/10/2022     Primary Coverage     Payor Plan Insurance Group Employer/Plan Group    HUMANA MEDICARE REPLACEMENT HU[employer]NA MEDICARE REPLACEMENT [id number]     Payor Plan Address Payor Plan Phone Number Payor Plan Fax Number Effective Dates    [address] [address] [phone]  1/1/2022 - None Entered    [facility] [id number]       Subscriber Name Subscriber Birth Date Member ID       MARJORIE CURTIS 1950 [id number]                 Emergency Contacts      (Rel.) Home Phone Work Phone Mobile Phone    BradleyDukeJassi (Son) [phone] -- [phone]    Armen Huerta (Spouse) [phone] -- [phone]        "

## 2022-10-06 ENCOUNTER — OFFICE VISIT (OUTPATIENT)
Dept: ORTHOPEDIC SURGERY | Facility: CLINIC | Age: 72
End: 2022-10-06

## 2022-10-06 VITALS
WEIGHT: 207.6 LBS | BODY MASS INDEX: 32.58 KG/M2 | SYSTOLIC BLOOD PRESSURE: 122 MMHG | HEIGHT: 67 IN | DIASTOLIC BLOOD PRESSURE: 80 MMHG | TEMPERATURE: 97.8 F

## 2022-10-06 DIAGNOSIS — M16.12 PRIMARY OSTEOARTHRITIS OF LEFT HIP: Primary | ICD-10-CM

## 2022-10-06 PROCEDURE — S0260 H&P FOR SURGERY: HCPCS | Performed by: NURSE PRACTITIONER

## 2022-10-06 NOTE — H&P
Patient: Marjorie Curtis    Date of Admission: 10/10/2022    YOB: 1950    Medical Record Number: [id number]    Admitting Physician: Dr. Miguel Sosa    Reason for Admission: End Stage Left Hip OA    History of Present Illness: 72 y.o. female presents with severe end stage hip osteoarthritis which has not been responsive to the full compliment of conservative measures. Despite conservative attempts, there is still severe, constant activity limiting hip pain. Given the severity of the pain, the patient has elected to proceed with hip replacement.    Allergies:   Allergies   Allergen Reactions   • Sulfa Antibiotics Swelling and Rash         Current Medications:  Home Medications:    Current Outpatient Medications on File Prior to Visit   Medication Sig   • acetaminophen (TYLENOL) 650 MG 8 hr tablet Take 650 mg by mouth Every 8 (Eight) Hours As Needed for Mild Pain .   • CALCIUM-VITAMIN D PO Take 1 tablet by mouth Daily.   • Chlorhexidine Gluconate Cloth 2 % pads Apply  topically. AS DIRECTED PREOP   • cholecalciferol (VITAMIN D3) 25 MCG (1000 UT) tablet Take 1,000 Units by mouth Daily.   • clotrimazole (LOTRIMIN) 1 % cream APPLY TO THE AFFECTED AREA SURROUNDING AREAS OF SKIN TOPICALLY TWICE DAILY IN THE MORNING AND EVENING   • Flaxseed, Linseed, (FLAXSEED OIL PO) Take 1 tablet by mouth Daily.   • levothyroxine (SYNTHROID, LEVOTHROID) 25 MCG tablet Take 25 mcg by mouth Daily.   • losartan (COZAAR) 50 MG tablet Take 50 mg by mouth Daily.   • mupirocin (BACTROBAN) 2 % nasal ointment into the nostril(s) as directed by provider. AS DIRECTED PREOP   • Probiotic Product (ALIGN PO) Take 1 tablet by mouth Daily.   • vitamin D (ERGOCALCIFEROL) 1.25 MG (68145 UT) capsule capsule Take 50,000 Units by mouth 1 (One) Time Per Week.     Current Facility-Administered Medications on File Prior to Visit   Medication   • Chlorhexidine Gluconate Cloth 2 % pads     PRN Meds:.    PMH:  Past Medical History:   Diagnosis Date   •  "Arthritis    • Brain fog     AFTER RIGHT HIP REPLACEMENT   • Disease of thyroid gland     HYPOTHYROIDISM   • History of skin cancer    • Hypertension    • Left hip pain    • Murmur    • Osteoarthritis         PSURGH:  Past Surgical History:   Procedure Laterality Date   •  SECTION     • COLONOSCOPY     • COLONOSCOPY N/A 2019    Procedure: COLONOSCOPY TO CECUM WITH POLYPECTOMY COLD SNARE;  Surgeon: Waldemar Valadez MD;  Location: [facility];  Service: Gastroenterology   • HIP SURGERY Right    • JOINT REPLACEMENT     • TONSILLECTOMY         SocialHx:  Social History     Occupational History   • Not on file   Tobacco Use   • Smoking status: Never Smoker   • Smokeless tobacco: Never Used   Vaping Use   • Vaping Use: Never used   Substance and Sexual Activity   • Alcohol use: Yes     Comment: SOCIAL   • Drug use: No   • Sexual activity: Defer      Social History     Social History Narrative   • Not on file       FamHx:  Family History   Problem Relation Age of Onset   • Parkinsonism Mother    • Colon cancer Father    • Malig Hyperthermia Neg Hx          Review of Systems:   A 14 point review of systems was performed, pertinent positives discussed above, all other systems are negative    Physical Exam: 72 y.o. female  Vital Signs :   Vitals:    10/06/22 1421   BP: 122/80   BP Location: Left arm   Patient Position: Sitting   Cuff Size: Large Adult   Temp: 97.8 °F (36.6 °C)   TempSrc: Temporal   Weight: 94.2 kg (207 lb 9.6 oz)   Height: 170.2 cm (67.01\")     General: Alert and Oriented x 3, No acute distress.  Psych: mood and affect appropriate; recent and remote memory intact  Eyes: conjunctivae clear; pupils equally round and reactive, sclerae antiicteric  CV: no peripheral edema  Resp: normal respiratory effort  Skin: no rashes or wounds; normal turgor  Musculosketetal; pain with hip range of motion. Positive Stinchfeld test. No trochanteric tenderness.  Vascular: palpable distal " pulses    Labs:    No visits with results within 3 Week(s) from this visit.   Latest known visit with results is:   Admission on 05/14/2019, Discharged on 05/14/2019   Component Date Value Ref Range Status   • Case Report 05/14/2019    Final                    Value:Surgical Pathology Report                         Case: [id number]                                  Authorizing Provider:  Waldemar Valadez MD  Collected:           05/14/2019 08:35 AM          Ordering Location:     [facility]  Received:            05/14/2019 10:07 AM                                 ENDO SUITES                                                                  Pathologist:           Zena Rahman MD                                                          Specimens:   1) - Large Intestine, Right / Ascending Colon                                                       2) - Large Intestine, Transverse Colon                                                    • Final Diagnosis 05/14/2019    Final                    Value:This result contains rich text formatting which cannot be displayed here.   • Gross Description 05/14/2019    Final                    Value:This result contains rich text formatting which cannot be displayed here.   • Microscopic Description 05/14/2019    Final                    Value:This result contains rich text formatting which cannot be displayed here.     Xrays:  Xrays AP pelvis and a lateral of the Left hip were reviewed demonstrating  End stage hip OA with bone on bone articulation, subchondral cysts and periarticular osteophytes.    Assessment:  End-stage Left hip osteoarthritis. Conservative measures have failed.      Plan:  The plan is to proceed with Left Total Hip Replacement. The patient voiced understanding of the risks, benefits, and alternative forms of treatment that were discussed with Dr Sosa at the time of scheduling.  Same day [facility]    Letitia Knowles, MEIR  10/6/2022

## 2022-10-06 NOTE — H&P (VIEW-ONLY)
Patient: Marjorie Curtis    Date of Admission: 10/10/2022    YOB: 1950    Medical Record Number: [id number]    Admitting Physician: Dr. Miguel Sosa    Reason for Admission: End Stage Left Hip OA    History of Present Illness: 72 y.o. female presents with severe end stage hip osteoarthritis which has not been responsive to the full compliment of conservative measures. Despite conservative attempts, there is still severe, constant activity limiting hip pain. Given the severity of the pain, the patient has elected to proceed with hip replacement.    Allergies:   Allergies   Allergen Reactions   • Sulfa Antibiotics Swelling and Rash         Current Medications:  Home Medications:    Current Outpatient Medications on File Prior to Visit   Medication Sig   • acetaminophen (TYLENOL) 650 MG 8 hr tablet Take 650 mg by mouth Every 8 (Eight) Hours As Needed for Mild Pain .   • CALCIUM-VITAMIN D PO Take 1 tablet by mouth Daily.   • Chlorhexidine Gluconate Cloth 2 % pads Apply  topically. AS DIRECTED PREOP   • cholecalciferol (VITAMIN D3) 25 MCG (1000 UT) tablet Take 1,000 Units by mouth Daily.   • clotrimazole (LOTRIMIN) 1 % cream APPLY TO THE AFFECTED AREA SURROUNDING AREAS OF SKIN TOPICALLY TWICE DAILY IN THE MORNING AND EVENING   • Flaxseed, Linseed, (FLAXSEED OIL PO) Take 1 tablet by mouth Daily.   • levothyroxine (SYNTHROID, LEVOTHROID) 25 MCG tablet Take 25 mcg by mouth Daily.   • losartan (COZAAR) 50 MG tablet Take 50 mg by mouth Daily.   • mupirocin (BACTROBAN) 2 % nasal ointment into the nostril(s) as directed by provider. AS DIRECTED PREOP   • Probiotic Product (ALIGN PO) Take 1 tablet by mouth Daily.   • vitamin D (ERGOCALCIFEROL) 1.25 MG (21988 UT) capsule capsule Take 50,000 Units by mouth 1 (One) Time Per Week.     Current Facility-Administered Medications on File Prior to Visit   Medication   • Chlorhexidine Gluconate Cloth 2 % pads     PRN Meds:.    PMH:  Past Medical History:   Diagnosis Date   •  "Arthritis    • Brain fog     AFTER RIGHT HIP REPLACEMENT   • Disease of thyroid gland     HYPOTHYROIDISM   • History of skin cancer    • Hypertension    • Left hip pain    • Murmur    • Osteoarthritis         PSURGH:  Past Surgical History:   Procedure Laterality Date   •  SECTION     • COLONOSCOPY     • COLONOSCOPY N/A 2019    Procedure: COLONOSCOPY TO CECUM WITH POLYPECTOMY COLD SNARE;  Surgeon: Waldemar Valadez MD;  Location: [facility];  Service: Gastroenterology   • HIP SURGERY Right    • JOINT REPLACEMENT     • TONSILLECTOMY         SocialHx:  Social History     Occupational History   • Not on file   Tobacco Use   • Smoking status: Never Smoker   • Smokeless tobacco: Never Used   Vaping Use   • Vaping Use: Never used   Substance and Sexual Activity   • Alcohol use: Yes     Comment: SOCIAL   • Drug use: No   • Sexual activity: Defer      Social History     Social History Narrative   • Not on file       FamHx:  Family History   Problem Relation Age of Onset   • Parkinsonism Mother    • Colon cancer Father    • Malig Hyperthermia Neg Hx          Review of Systems:   A 14 point review of systems was performed, pertinent positives discussed above, all other systems are negative    Physical Exam: 72 y.o. female  Vital Signs :   Vitals:    10/06/22 1421   BP: 122/80   BP Location: Left arm   Patient Position: Sitting   Cuff Size: Large Adult   Temp: 97.8 °F (36.6 °C)   TempSrc: Temporal   Weight: 94.2 kg (207 lb 9.6 oz)   Height: 170.2 cm (67.01\")     General: Alert and Oriented x 3, No acute distress.  Psych: mood and affect appropriate; recent and remote memory intact  Eyes: conjunctivae clear; pupils equally round and reactive, sclerae antiicteric  CV: no peripheral edema  Resp: normal respiratory effort  Skin: no rashes or wounds; normal turgor  Musculosketetal; pain with hip range of motion. Positive Stinchfeld test. No trochanteric tenderness.  Vascular: palpable distal " pulses    Labs:    No visits with results within 3 Week(s) from this visit.   Latest known visit with results is:   Admission on 05/14/2019, Discharged on 05/14/2019   Component Date Value Ref Range Status   • Case Report 05/14/2019    Final                    Value:Surgical Pathology Report                         Case: [id number]                                  Authorizing Provider:  Waldemar Valadez MD  Collected:           05/14/2019 08:35 AM          Ordering Location:     [facility]  Received:            05/14/2019 10:07 AM                                 ENDO SUITES                                                                  Pathologist:           Zena Rahman MD                                                          Specimens:   1) - Large Intestine, Right / Ascending Colon                                                       2) - Large Intestine, Transverse Colon                                                    • Final Diagnosis 05/14/2019    Final                    Value:This result contains rich text formatting which cannot be displayed here.   • Gross Description 05/14/2019    Final                    Value:This result contains rich text formatting which cannot be displayed here.   • Microscopic Description 05/14/2019    Final                    Value:This result contains rich text formatting which cannot be displayed here.     Xrays:  Xrays AP pelvis and a lateral of the Left hip were reviewed demonstrating  End stage hip OA with bone on bone articulation, subchondral cysts and periarticular osteophytes.    Assessment:  End-stage Left hip osteoarthritis. Conservative measures have failed.      Plan:  The plan is to proceed with Left Total Hip Replacement. The patient voiced understanding of the risks, benefits, and alternative forms of treatment that were discussed with Dr Sosa at the time of scheduling.  Same day [facility]    Letitia Knowles, MEIR  10/6/2022

## 2022-10-10 ENCOUNTER — APPOINTMENT (OUTPATIENT)
Dept: GENERAL RADIOLOGY | Facility: HOSPITAL | Age: 72
End: 2022-10-10

## 2022-10-10 ENCOUNTER — HOSPITAL ENCOUNTER (OUTPATIENT)
Facility: HOSPITAL | Age: 72
Setting detail: HOSPITAL OUTPATIENT SURGERY
Discharge: HOME HEALTH | End: 2022-10-10
Attending: ORTHOPAEDIC SURGERY | Admitting: ORTHOPAEDIC SURGERY

## 2022-10-10 ENCOUNTER — HOME HEALTH ADMISSION (OUTPATIENT)
Dept: HOME HEALTH SERVICES | Facility: HOME HEALTHCARE | Age: 72
End: 2022-10-10

## 2022-10-10 ENCOUNTER — ANESTHESIA (OUTPATIENT)
Dept: PERIOP | Facility: HOSPITAL | Age: 72
End: 2022-10-10

## 2022-10-10 ENCOUNTER — ANESTHESIA EVENT (OUTPATIENT)
Dept: PERIOP | Facility: HOSPITAL | Age: 72
End: 2022-10-10

## 2022-10-10 VITALS
SYSTOLIC BLOOD PRESSURE: 140 MMHG | RESPIRATION RATE: 16 BRPM | OXYGEN SATURATION: 98 % | DIASTOLIC BLOOD PRESSURE: 77 MMHG | TEMPERATURE: 97.9 F | HEART RATE: 44 BPM

## 2022-10-10 DIAGNOSIS — M16.12 PRIMARY OSTEOARTHRITIS OF LEFT HIP: ICD-10-CM

## 2022-10-10 LAB
ABO GROUP BLD: NORMAL
BLD GP AB SCN SERPL QL: NEGATIVE
RH BLD: POSITIVE
T&S EXPIRATION DATE: NORMAL

## 2022-10-10 PROCEDURE — 25010000002 FENTANYL CITRATE (PF) 100 MCG/2ML SOLUTION: Performed by: NURSE ANESTHETIST, CERTIFIED REGISTERED

## 2022-10-10 PROCEDURE — 97161 PT EVAL LOW COMPLEX 20 MIN: CPT

## 2022-10-10 PROCEDURE — 97110 THERAPEUTIC EXERCISES: CPT

## 2022-10-10 PROCEDURE — C1776 JOINT DEVICE (IMPLANTABLE): HCPCS | Performed by: ORTHOPAEDIC SURGERY

## 2022-10-10 PROCEDURE — C1713 ANCHOR/SCREW BN/BN,TIS/BN: HCPCS | Performed by: ORTHOPAEDIC SURGERY

## 2022-10-10 PROCEDURE — 25010000002 FENTANYL CITRATE (PF) 50 MCG/ML SOLUTION: Performed by: NURSE ANESTHETIST, CERTIFIED REGISTERED

## 2022-10-10 PROCEDURE — 73501 X-RAY EXAM HIP UNI 1 VIEW: CPT

## 2022-10-10 PROCEDURE — 25010000002 EPINEPHRINE 1 MG/ML SOLUTION 30 ML VIAL: Performed by: ORTHOPAEDIC SURGERY

## 2022-10-10 PROCEDURE — 25010000002 CEFAZOLIN IN DEXTROSE 2-4 GM/100ML-% SOLUTION: Performed by: NURSE PRACTITIONER

## 2022-10-10 PROCEDURE — 86900 BLOOD TYPING SEROLOGIC ABO: CPT | Performed by: NURSE PRACTITIONER

## 2022-10-10 PROCEDURE — 25010000002 VANCOMYCIN 10 G RECONSTITUTED SOLUTION: Performed by: NURSE PRACTITIONER

## 2022-10-10 PROCEDURE — 25010000002 KETOROLAC TROMETHAMINE PER 15 MG: Performed by: ORTHOPAEDIC SURGERY

## 2022-10-10 PROCEDURE — 25010000002 MORPHINE PER 10 MG: Performed by: ORTHOPAEDIC SURGERY

## 2022-10-10 PROCEDURE — 27130 TOTAL HIP ARTHROPLASTY: CPT | Performed by: ORTHOPAEDIC SURGERY

## 2022-10-10 PROCEDURE — 76000 FLUOROSCOPY <1 HR PHYS/QHP: CPT

## 2022-10-10 PROCEDURE — 86901 BLOOD TYPING SEROLOGIC RH(D): CPT | Performed by: NURSE PRACTITIONER

## 2022-10-10 PROCEDURE — 25010000002 ROPIVACAINE PER 1 MG: Performed by: ORTHOPAEDIC SURGERY

## 2022-10-10 PROCEDURE — 25010000002 DEXAMETHASONE SODIUM PHOSPHATE 20 MG/5ML SOLUTION: Performed by: NURSE ANESTHETIST, CERTIFIED REGISTERED

## 2022-10-10 PROCEDURE — 25010000002 PROPOFOL 10 MG/ML EMULSION: Performed by: NURSE ANESTHETIST, CERTIFIED REGISTERED

## 2022-10-10 PROCEDURE — 86850 RBC ANTIBODY SCREEN: CPT | Performed by: NURSE PRACTITIONER

## 2022-10-10 PROCEDURE — 25010000002 ONDANSETRON PER 1 MG: Performed by: NURSE ANESTHETIST, CERTIFIED REGISTERED

## 2022-10-10 DEVICE — R3 3 HOLE ACETABULAR SHELL 52MM
Type: IMPLANTABLE DEVICE | Site: HIP | Status: FUNCTIONAL
Brand: R3 ACETABULAR

## 2022-10-10 DEVICE — IMPLANTABLE DEVICE: Type: IMPLANTABLE DEVICE | Site: HIP | Status: FUNCTIONAL

## 2022-10-10 DEVICE — KNOTLESS TISSUE CONTROL DEVICE, UNDYED UNIDIRECTIONAL (ANTIBACTERIAL) SYNTHETIC ABSORBABLE DEVICE
Type: IMPLANTABLE DEVICE | Site: HIP | Status: FUNCTIONAL
Brand: STRATAFIX

## 2022-10-10 DEVICE — REFLECTION SPHERICAL HEAD SCREW 35MM
Type: IMPLANTABLE DEVICE | Site: HIP | Status: FUNCTIONAL
Brand: REFLECTION

## 2022-10-10 DEVICE — REFLECTION SPHERICAL HEAD SCREW 30MM
Type: IMPLANTABLE DEVICE | Site: HIP | Status: FUNCTIONAL
Brand: REFLECTION

## 2022-10-10 DEVICE — KNOTLESS TISSUE CONTROL DEVICE, VIOLET UNIDIRECTIONAL (ANTIBACTERIAL) SYNTHETIC ABSORBABLE DEVICE
Type: IMPLANTABLE DEVICE | Site: HIP | Status: FUNCTIONAL
Brand: STRATAFIX

## 2022-10-10 DEVICE — R3 0 DEGREE XLPE ACETABULAR LINER                                    36MM INNER DIAMETER X OUTER DIAMETER 52MM
Type: IMPLANTABLE DEVICE | Site: HIP | Status: FUNCTIONAL
Brand: R3

## 2022-10-10 DEVICE — OXINIUM FEMORAL HEAD 12/14 TAPER                                    36 MM -3
Type: IMPLANTABLE DEVICE | Site: HIP | Status: FUNCTIONAL
Brand: OXINIUM

## 2022-10-10 DEVICE — POLARSTEM COLLAR STANDARD                                    NON-CEMENTED WITH TI/HA 2
Type: IMPLANTABLE DEVICE | Site: HIP | Status: FUNCTIONAL
Brand: POLARSTEM

## 2022-10-10 RX ORDER — SODIUM CHLORIDE, SODIUM LACTATE, POTASSIUM CHLORIDE, CALCIUM CHLORIDE 600; 310; 30; 20 MG/100ML; MG/100ML; MG/100ML; MG/100ML
9 INJECTION, SOLUTION INTRAVENOUS CONTINUOUS PRN
Status: DISCONTINUED | OUTPATIENT
Start: 2022-10-10 | End: 2022-10-10 | Stop reason: HOSPADM

## 2022-10-10 RX ORDER — HYDRALAZINE HYDROCHLORIDE 20 MG/ML
5 INJECTION INTRAMUSCULAR; INTRAVENOUS
Status: DISCONTINUED | OUTPATIENT
Start: 2022-10-10 | End: 2022-10-10 | Stop reason: HOSPADM

## 2022-10-10 RX ORDER — CEFAZOLIN SODIUM 2 G/100ML
2 INJECTION, SOLUTION INTRAVENOUS ONCE
Status: COMPLETED | OUTPATIENT
Start: 2022-10-10 | End: 2022-10-10

## 2022-10-10 RX ORDER — SODIUM CHLORIDE 0.9 % (FLUSH) 0.9 %
10 SYRINGE (ML) INJECTION EVERY 12 HOURS SCHEDULED
Status: DISCONTINUED | OUTPATIENT
Start: 2022-10-10 | End: 2022-10-10 | Stop reason: HOSPADM

## 2022-10-10 RX ORDER — ROCURONIUM BROMIDE 10 MG/ML
INJECTION, SOLUTION INTRAVENOUS AS NEEDED
Status: DISCONTINUED | OUTPATIENT
Start: 2022-10-10 | End: 2022-10-10 | Stop reason: SURG

## 2022-10-10 RX ORDER — TRANEXAMIC ACID 100 MG/ML
INJECTION, SOLUTION INTRAVENOUS AS NEEDED
Status: DISCONTINUED | OUTPATIENT
Start: 2022-10-10 | End: 2022-10-10 | Stop reason: SURG

## 2022-10-10 RX ORDER — LIDOCAINE HYDROCHLORIDE 20 MG/ML
INJECTION, SOLUTION INFILTRATION; PERINEURAL AS NEEDED
Status: DISCONTINUED | OUTPATIENT
Start: 2022-10-10 | End: 2022-10-10 | Stop reason: SURG

## 2022-10-10 RX ORDER — ASPIRIN 81 MG/1
TABLET ORAL
Qty: 60 TABLET | Refills: 0 | Status: SHIPPED | OUTPATIENT
Start: 2022-10-10 | End: 2022-11-21

## 2022-10-10 RX ORDER — DIPHENHYDRAMINE HYDROCHLORIDE 50 MG/ML
12.5 INJECTION INTRAMUSCULAR; INTRAVENOUS
Status: DISCONTINUED | OUTPATIENT
Start: 2022-10-10 | End: 2022-10-10 | Stop reason: HOSPADM

## 2022-10-10 RX ORDER — HYDROCODONE BITARTRATE AND ACETAMINOPHEN 7.5; 325 MG/1; MG/1
1 TABLET ORAL EVERY 4 HOURS PRN
Status: DISCONTINUED | OUTPATIENT
Start: 2022-10-10 | End: 2022-10-10 | Stop reason: HOSPADM

## 2022-10-10 RX ORDER — ONDANSETRON 4 MG/1
4 TABLET, FILM COATED ORAL EVERY 8 HOURS PRN
Qty: 10 TABLET | Refills: 0 | Status: SHIPPED | OUTPATIENT
Start: 2022-10-10

## 2022-10-10 RX ORDER — PANTOPRAZOLE SODIUM 40 MG/1
40 TABLET, DELAYED RELEASE ORAL DAILY
Qty: 14 TABLET | Refills: 0 | Status: SHIPPED | OUTPATIENT
Start: 2022-10-10 | End: 2022-10-24

## 2022-10-10 RX ORDER — ONDANSETRON 2 MG/ML
4 INJECTION INTRAMUSCULAR; INTRAVENOUS ONCE AS NEEDED
Status: DISCONTINUED | OUTPATIENT
Start: 2022-10-10 | End: 2022-10-10 | Stop reason: HOSPADM

## 2022-10-10 RX ORDER — EPHEDRINE SULFATE 50 MG/ML
5 INJECTION, SOLUTION INTRAVENOUS ONCE AS NEEDED
Status: DISCONTINUED | OUTPATIENT
Start: 2022-10-10 | End: 2022-10-10 | Stop reason: HOSPADM

## 2022-10-10 RX ORDER — PREGABALIN 75 MG/1
150 CAPSULE ORAL ONCE
Status: COMPLETED | OUTPATIENT
Start: 2022-10-10 | End: 2022-10-10

## 2022-10-10 RX ORDER — ONDANSETRON 4 MG/1
4 TABLET, FILM COATED ORAL EVERY 6 HOURS PRN
Status: DISCONTINUED | OUTPATIENT
Start: 2022-10-10 | End: 2022-10-10 | Stop reason: HOSPADM

## 2022-10-10 RX ORDER — OXYCODONE AND ACETAMINOPHEN 7.5; 325 MG/1; MG/1
1 TABLET ORAL EVERY 4 HOURS PRN
Status: DISCONTINUED | OUTPATIENT
Start: 2022-10-10 | End: 2022-10-10 | Stop reason: HOSPADM

## 2022-10-10 RX ORDER — HYDROCODONE BITARTRATE AND ACETAMINOPHEN 7.5; 325 MG/1; MG/1
1-2 TABLET ORAL EVERY 4 HOURS PRN
Qty: 42 TABLET | Refills: 0 | Status: SHIPPED | OUTPATIENT
Start: 2022-10-10

## 2022-10-10 RX ORDER — DIPHENHYDRAMINE HCL 25 MG
25 CAPSULE ORAL
Status: DISCONTINUED | OUTPATIENT
Start: 2022-10-10 | End: 2022-10-10 | Stop reason: HOSPADM

## 2022-10-10 RX ORDER — HYDROCODONE BITARTRATE AND ACETAMINOPHEN 7.5; 325 MG/1; MG/1
1 TABLET ORAL ONCE AS NEEDED
Status: COMPLETED | OUTPATIENT
Start: 2022-10-10 | End: 2022-10-10

## 2022-10-10 RX ORDER — FENTANYL CITRATE 50 UG/ML
50 INJECTION, SOLUTION INTRAMUSCULAR; INTRAVENOUS
Status: DISCONTINUED | OUTPATIENT
Start: 2022-10-10 | End: 2022-10-10 | Stop reason: HOSPADM

## 2022-10-10 RX ORDER — NALOXONE HCL 0.4 MG/ML
0.2 VIAL (ML) INJECTION AS NEEDED
Status: DISCONTINUED | OUTPATIENT
Start: 2022-10-10 | End: 2022-10-10 | Stop reason: HOSPADM

## 2022-10-10 RX ORDER — ACETAMINOPHEN 325 MG/1
650 TABLET ORAL EVERY 6 HOURS PRN
Status: DISCONTINUED | OUTPATIENT
Start: 2022-10-10 | End: 2022-10-10 | Stop reason: HOSPADM

## 2022-10-10 RX ORDER — SODIUM CHLORIDE 0.9 % (FLUSH) 0.9 %
10 SYRINGE (ML) INJECTION AS NEEDED
Status: DISCONTINUED | OUTPATIENT
Start: 2022-10-10 | End: 2022-10-10 | Stop reason: HOSPADM

## 2022-10-10 RX ORDER — CEFAZOLIN SODIUM IN 0.9 % NACL 3 G/100 ML
3 INTRAVENOUS SOLUTION, PIGGYBACK (ML) INTRAVENOUS ONCE
Status: DISCONTINUED | OUTPATIENT
Start: 2022-10-10 | End: 2022-10-10

## 2022-10-10 RX ORDER — ACETAMINOPHEN 10 MG/ML
INJECTION, SOLUTION INTRAVENOUS AS NEEDED
Status: DISCONTINUED | OUTPATIENT
Start: 2022-10-10 | End: 2022-10-10 | Stop reason: SURG

## 2022-10-10 RX ORDER — UREA 10 %
1 LOTION (ML) TOPICAL NIGHTLY PRN
Status: DISCONTINUED | OUTPATIENT
Start: 2022-10-10 | End: 2022-10-10 | Stop reason: HOSPADM

## 2022-10-10 RX ORDER — POLYETHYLENE GLYCOL 3350 17 G/17G
17 POWDER, FOR SOLUTION ORAL 2 TIMES DAILY
Qty: 238 G | Refills: 0 | Status: SHIPPED | OUTPATIENT
Start: 2022-10-10 | End: 2022-10-17

## 2022-10-10 RX ORDER — LABETALOL HYDROCHLORIDE 5 MG/ML
5 INJECTION, SOLUTION INTRAVENOUS
Status: DISCONTINUED | OUTPATIENT
Start: 2022-10-10 | End: 2022-10-10 | Stop reason: HOSPADM

## 2022-10-10 RX ORDER — POVIDONE-IODINE 10 MG/ML
SOLUTION TOPICAL ONCE
Status: DISCONTINUED | OUTPATIENT
Start: 2022-10-10 | End: 2022-10-10 | Stop reason: HOSPADM

## 2022-10-10 RX ORDER — EPHEDRINE SULFATE 50 MG/ML
INJECTION INTRAVENOUS AS NEEDED
Status: DISCONTINUED | OUTPATIENT
Start: 2022-10-10 | End: 2022-10-10 | Stop reason: SURG

## 2022-10-10 RX ORDER — DEXAMETHASONE SODIUM PHOSPHATE 4 MG/ML
INJECTION, SOLUTION INTRA-ARTICULAR; INTRALESIONAL; INTRAMUSCULAR; INTRAVENOUS; SOFT TISSUE AS NEEDED
Status: DISCONTINUED | OUTPATIENT
Start: 2022-10-10 | End: 2022-10-10 | Stop reason: SURG

## 2022-10-10 RX ORDER — ASPIRIN 81 MG/1
81 TABLET ORAL EVERY 12 HOURS SCHEDULED
Status: DISCONTINUED | OUTPATIENT
Start: 2022-10-11 | End: 2022-10-10 | Stop reason: HOSPADM

## 2022-10-10 RX ORDER — ONDANSETRON 2 MG/ML
INJECTION INTRAMUSCULAR; INTRAVENOUS AS NEEDED
Status: DISCONTINUED | OUTPATIENT
Start: 2022-10-10 | End: 2022-10-10 | Stop reason: SURG

## 2022-10-10 RX ORDER — MAGNESIUM HYDROXIDE 1200 MG/15ML
LIQUID ORAL AS NEEDED
Status: DISCONTINUED | OUTPATIENT
Start: 2022-10-10 | End: 2022-10-10 | Stop reason: HOSPADM

## 2022-10-10 RX ORDER — MIDAZOLAM HYDROCHLORIDE 1 MG/ML
0.5 INJECTION INTRAMUSCULAR; INTRAVENOUS
Status: DISCONTINUED | OUTPATIENT
Start: 2022-10-10 | End: 2022-10-10 | Stop reason: HOSPADM

## 2022-10-10 RX ORDER — IBUPROFEN 600 MG/1
600 TABLET ORAL ONCE AS NEEDED
Status: DISCONTINUED | OUTPATIENT
Start: 2022-10-10 | End: 2022-10-10 | Stop reason: HOSPADM

## 2022-10-10 RX ORDER — PROMETHAZINE HYDROCHLORIDE 25 MG/1
25 TABLET ORAL ONCE AS NEEDED
Status: DISCONTINUED | OUTPATIENT
Start: 2022-10-10 | End: 2022-10-10 | Stop reason: HOSPADM

## 2022-10-10 RX ORDER — PROPOFOL 10 MG/ML
VIAL (ML) INTRAVENOUS AS NEEDED
Status: DISCONTINUED | OUTPATIENT
Start: 2022-10-10 | End: 2022-10-10 | Stop reason: SURG

## 2022-10-10 RX ORDER — FLUMAZENIL 0.1 MG/ML
0.2 INJECTION INTRAVENOUS AS NEEDED
Status: DISCONTINUED | OUTPATIENT
Start: 2022-10-10 | End: 2022-10-10 | Stop reason: HOSPADM

## 2022-10-10 RX ORDER — CEFAZOLIN SODIUM 2 G/100ML
2 INJECTION, SOLUTION INTRAVENOUS EVERY 8 HOURS
Status: DISCONTINUED | OUTPATIENT
Start: 2022-10-10 | End: 2022-10-10 | Stop reason: HOSPADM

## 2022-10-10 RX ORDER — PROMETHAZINE HYDROCHLORIDE 25 MG/1
25 SUPPOSITORY RECTAL ONCE AS NEEDED
Status: DISCONTINUED | OUTPATIENT
Start: 2022-10-10 | End: 2022-10-10 | Stop reason: HOSPADM

## 2022-10-10 RX ORDER — MELOXICAM 7.5 MG/1
15 TABLET ORAL DAILY
Qty: 28 TABLET | Refills: 0 | Status: SHIPPED | OUTPATIENT
Start: 2022-10-10 | End: 2022-10-24

## 2022-10-10 RX ORDER — MELOXICAM 15 MG/1
15 TABLET ORAL ONCE
Status: COMPLETED | OUTPATIENT
Start: 2022-10-10 | End: 2022-10-10

## 2022-10-10 RX ORDER — FENTANYL CITRATE 50 UG/ML
INJECTION, SOLUTION INTRAMUSCULAR; INTRAVENOUS AS NEEDED
Status: DISCONTINUED | OUTPATIENT
Start: 2022-10-10 | End: 2022-10-10 | Stop reason: SURG

## 2022-10-10 RX ORDER — HYDROMORPHONE HYDROCHLORIDE 1 MG/ML
0.5 INJECTION, SOLUTION INTRAMUSCULAR; INTRAVENOUS; SUBCUTANEOUS
Status: DISCONTINUED | OUTPATIENT
Start: 2022-10-10 | End: 2022-10-10 | Stop reason: HOSPADM

## 2022-10-10 RX ORDER — PROMETHAZINE HYDROCHLORIDE 25 MG/1
12.5 TABLET ORAL EVERY 4 HOURS PRN
Status: DISCONTINUED | OUTPATIENT
Start: 2022-10-10 | End: 2022-10-10 | Stop reason: HOSPADM

## 2022-10-10 RX ORDER — LIDOCAINE HYDROCHLORIDE 10 MG/ML
0.5 INJECTION, SOLUTION EPIDURAL; INFILTRATION; INTRACAUDAL; PERINEURAL ONCE AS NEEDED
Status: DISCONTINUED | OUTPATIENT
Start: 2022-10-10 | End: 2022-10-10 | Stop reason: HOSPADM

## 2022-10-10 RX ADMIN — ONDANSETRON 4 MG: 2 INJECTION INTRAMUSCULAR; INTRAVENOUS at 09:22

## 2022-10-10 RX ADMIN — PROPOFOL 30 MG: 10 INJECTION, EMULSION INTRAVENOUS at 09:45

## 2022-10-10 RX ADMIN — EPHEDRINE SULFATE 5 MG: 50 INJECTION INTRAVENOUS at 09:17

## 2022-10-10 RX ADMIN — SUGAMMADEX 200 MG: 100 INJECTION, SOLUTION INTRAVENOUS at 09:42

## 2022-10-10 RX ADMIN — FENTANYL CITRATE 25 MCG: 50 INJECTION, SOLUTION INTRAMUSCULAR; INTRAVENOUS at 09:59

## 2022-10-10 RX ADMIN — PROPOFOL 120 MCG/KG/MIN: 10 INJECTION, EMULSION INTRAVENOUS at 08:12

## 2022-10-10 RX ADMIN — PROPOFOL 30 MG: 10 INJECTION, EMULSION INTRAVENOUS at 09:48

## 2022-10-10 RX ADMIN — FENTANYL CITRATE 25 MCG: 50 INJECTION, SOLUTION INTRAMUSCULAR; INTRAVENOUS at 09:47

## 2022-10-10 RX ADMIN — PREGABALIN 150 MG: 75 CAPSULE ORAL at 06:38

## 2022-10-10 RX ADMIN — TRANEXAMIC ACID 1000 MG: 1 INJECTION, SOLUTION INTRAVENOUS at 09:32

## 2022-10-10 RX ADMIN — ROCURONIUM BROMIDE 40 MG: 50 INJECTION INTRAVENOUS at 08:12

## 2022-10-10 RX ADMIN — ACETAMINOPHEN 1000 MG: 10 INJECTION, SOLUTION INTRAVENOUS at 08:20

## 2022-10-10 RX ADMIN — FENTANYL CITRATE 50 MCG: 50 INJECTION INTRAMUSCULAR; INTRAVENOUS at 10:47

## 2022-10-10 RX ADMIN — TRANEXAMIC ACID 1000 MG: 1 INJECTION, SOLUTION INTRAVENOUS at 08:19

## 2022-10-10 RX ADMIN — MELOXICAM 15 MG: 15 TABLET ORAL at 06:38

## 2022-10-10 RX ADMIN — PROPOFOL 50 MG: 10 INJECTION, EMULSION INTRAVENOUS at 08:19

## 2022-10-10 RX ADMIN — FENTANYL CITRATE 25 MCG: 50 INJECTION, SOLUTION INTRAMUSCULAR; INTRAVENOUS at 08:46

## 2022-10-10 RX ADMIN — VANCOMYCIN HYDROCHLORIDE 1500 MG: 10 INJECTION, POWDER, LYOPHILIZED, FOR SOLUTION INTRAVENOUS at 06:38

## 2022-10-10 RX ADMIN — DEXAMETHASONE SODIUM PHOSPHATE 8 MG: 4 INJECTION, SOLUTION INTRAMUSCULAR; INTRAVENOUS at 08:17

## 2022-10-10 RX ADMIN — CEFAZOLIN SODIUM 2 G: 2 INJECTION, SOLUTION INTRAVENOUS at 07:59

## 2022-10-10 RX ADMIN — FENTANYL CITRATE 25 MCG: 50 INJECTION, SOLUTION INTRAMUSCULAR; INTRAVENOUS at 08:09

## 2022-10-10 RX ADMIN — HYDROCODONE BITARTRATE AND ACETAMINOPHEN 1 TABLET: 7.5; 325 TABLET ORAL at 10:58

## 2022-10-10 RX ADMIN — LIDOCAINE HYDROCHLORIDE 90 MG: 20 INJECTION, SOLUTION INFILTRATION; PERINEURAL at 08:12

## 2022-10-10 RX ADMIN — PROPOFOL 30 MG: 10 INJECTION, EMULSION INTRAVENOUS at 09:23

## 2022-10-10 RX ADMIN — PROPOFOL 30 MG: 10 INJECTION, EMULSION INTRAVENOUS at 09:06

## 2022-10-10 RX ADMIN — PROPOFOL 120 MG: 10 INJECTION, EMULSION INTRAVENOUS at 08:12

## 2022-10-10 RX ADMIN — SODIUM CHLORIDE, POTASSIUM CHLORIDE, SODIUM LACTATE AND CALCIUM CHLORIDE: 600; 310; 30; 20 INJECTION, SOLUTION INTRAVENOUS at 08:03

## 2022-10-10 NOTE — NURSING NOTE
Pt c/o Slight lightheadness. IVF cont. Dr. Jack aware no new orders at this time. Will cont to monitor

## 2022-10-10 NOTE — PLAN OF CARE
Goal Outcome Evaluation:  Plan of Care Reviewed With: patient, spouse           Outcome Evaluation: Pt is POD 0 L anterior THR, PT consisted of strengthening and ROM ex, as well as gait and stair traning, pt did well and plans home today with assist of spouse, home health will begin tomorrow, has a rwx, BSC provided through [facility]

## 2022-10-10 NOTE — OP NOTE
Name: Marjorie Curtis  YOB: 1950    DATE OF SURGERY: 10/10/2022    PREOPERATIVE DIAGNOSIS: Left hip end-stage osteoarthritis    POSTOPERATIVE DIAGNOSIS: Left hip end-stage osteoarthritis    PROCEDURE PERFORMED: Left anterior total hip replacement    SURGEON: Miguel Sosa M.D.    ASSISTANT: DEVORAH MARTINEZ    A surgical assistant was integral in ensuring a successful outcome with this procedure.  The assistant was utilized to assist in positioning the patient, draping the patient, was used throughout the case to provide with retraction of tissues, suctioning of blood and body fluids for visualization, positioning of the extremity to allow for proper exposure so that I could perform the procedure.  Without the use of a surgical assistant during this procedure I feel that the outcome may have been compromised or would have been suboptimal or at risk for complications.    IMPLANTS: [employer] Polar stem, R3 cup:  Implant Name Type Inv. Item Serial No.  Lot No. LRB No. Used Action   DEV CONTRL TISS STRATAFIX SPIRAL MNCRYL UD 3/0 PLS 30CM - [id number] Implant DEV CONTRL TISS STRATAFIX SPIRAL MNCRYL UD 3/0 PLS 30CM  [employer]  DIV OF J AND J [id number] Left 1 Implanted   DEV WND/CLS CONTRL TISS STRATAFIX SPIRAL PDS PLS CT1 0 30CM - [id number] Implant DEV WND/CLS CONTRL TISS STRATAFIX SPIRAL PDS PLS CT1 0 30CM  [employer]  DIV OF J AND J [id number] Left 1 Implanted   SHLL ACET R3 3H STD 52MM - [id number] Implant SHLL ACET R3 3H STD 52MM  [employer] [id number] Left 1 Implanted   LINER ACET R3 XLPE 0D 37S55WM - [id number] Implant LINER ACET R3 XLPE 0D [id number]  [employer] [id number] Left 1 Implanted   SCRW SPH HD REFLECTION 6.5X30MM - [id number] Implant SCRW SPH HD REFLECTION 6.5X30MM  [employer] [id number] Left 1 Implanted   SCRW SPH HD REFLECTION 6.5X35MM - [id number] Implant SCRW SPH HD REFLECTION 6.5X35MM  [employer] [id number] Left 1 Implanted   STEM FEM/HIP POLARSTEM  "W/COLR STD SZ2 - [id number] Implant STEM FEM/HIP POLARSTEM W/COLR STD SZ2  [employer] [id number] Left 1 Implanted   HD FEM/HIP OXINIUM TPR 12/14 36MM MIN3 - [id number] Implant HD FEM/HIP OXINIUM TPR 12/14 36MM MIN3  [employer] [id number] Left 1 Implanted       Estimated Blood Loss: 200cc  Specimens : none  Complications: none    DESCRIPTION OF PROCEDURE: The patient was taken to the operating room and placed in the supine position. A sequential compression device was carefully placed on the non-operative leg. Preoperative antibiotics were administered. Surgical time out was performed. After adequate induction of anesthesia, the feet were padded and placed in the [place] table boots. The patient ws then transferred onto the [place] table and positioned appropriately. C-arm image intensification was then used to take images of the pelvis and operative hip to be used for later comparison. The hip was then prepped and draped in the usual sterile fashion.   An incision was then made starting 2 cm lateral to the ASIS heading distal and lateral at approximately 30 degrees. The subcutaneous fat was then divided down to the fascia overlying the tensor fascia magali (TFL) muscle. This was sharply divided staying a few cm lateral to the interval between the sartorius and the TFL muscle. This interval was then bluntly dissected. The circumflex vessels were then identified, cauterized, and divided. There was excellent hemostasis. Cobra retractors were then placed around the femoral neck capsule. The capsule was then divided using a \"T\" capsulotomy. The retractors were then placed intracapsular and the neck osteotomy was performed. A napkin ring neck fragment was then removed and then the head was removed using a corkscrew. There were end-stage arthritic findings.   The acetabulum was then exposed with \"number 7\" retractors. The labrum and pulvinar were excised. The starting reamer was then used to medialize the cup and then the " acetabular reaming proceeded in 2 mm increments. The cup was reamed line to line. The cup was then partially seated and c-arm was used to confirm the final cup position before final seating occurred. There was excellent position and stability of the cup.  The hip was then injected with anesthetic cocktail and the cup was anchored with 2 screws in the superior and posterior quadrants. The final liner was placed.   Attention was turned to the femur.Traction was removed from the hip and the leg was brought to the neutral position. The femoral elevator hook was placed. The leg was then moved to the external rotation, extension, and adduction position. Retractors were placed around the proximal femur and then the posterior capsule and conjoined tendon was then released. The box osteotome was then used to create the starting hole. The femur was then prepared using the rat tail broach, followed by the chili-pepper broach and then we progressively broached up the final broach which fit nicely with excellent rotational and axial stability. The hip was then reduced and c-arm images were taken which confirmed appropriate fit and position on the implants. There were no complicating factors noted, and flouro images were taken which confirmed proper restoration of leg length and offset. The trial components were removed, the hip was copiously irrigated and the final implants were then seated. C-arm images again confirmed appropriate anatomy restoration without complicating factors noted. The final head was then placed on a clean dry taper and the hip reduced.   The hip was then copiously irrigated.  There was excellent hemostasis. We placed a one-eighth inch Hemovac drain. We closed the hip in multiple layers in standard fashion. Sterile dressings were applied. At the end of the case, the sponge and needle counts were reported as being correct. There were no known complications. The patient was then transported to the recovery  room.      Miguel Sosa M.D.  10/10/2022

## 2022-10-10 NOTE — PROGRESS NOTES
[facility] following for home care needs.  D/Cing today after surgery.  Order in epic for PT per Dr Sosa.  Spoke with  and verified all info.  Call spouse to schedule at least for first couple visits.  Thank you.

## 2022-10-10 NOTE — THERAPY EVALUATION
Patient Name: Marjorie Curtis  : 1950    MRN: [id number]                              Today's Date: 10/10/2022       Admit Date: 10/10/2022    Visit Dx:     ICD-10-CM ICD-9-CM   1. Primary osteoarthritis of left hip  M16.12 715.15     Patient Active Problem List   Diagnosis   • Family history of colon cancer   • History of colon polyps   • Personal history of colonic polyps   • Primary osteoarthritis of left hip     Past Medical History:   Diagnosis Date   • Arthritis    • Brain fog     AFTER RIGHT HIP REPLACEMENT   • Disease of thyroid gland     HYPOTHYROIDISM   • History of skin cancer    • Hypertension    • Left hip pain    • Murmur    • Osteoarthritis      Past Surgical History:   Procedure Laterality Date   •  SECTION     • COLONOSCOPY     • COLONOSCOPY N/A 2019    Procedure: COLONOSCOPY TO CECUM WITH POLYPECTOMY COLD SNARE;  Surgeon: Waldemar Valadez MD;  Location: [facility] ENDOSCOPY;  Service: Gastroenterology   • HIP SURGERY Right    • JOINT REPLACEMENT     • TONSILLECTOMY        General Information     Row Name 10/10/22 1357          Physical Therapy Time and Intention    Document Type evaluation  -PC     Mode of Treatment physical therapy  -PC     Row Name 10/10/22 1356          General Information    Patient Profile Reviewed yes  -PC     Prior Level of Function independent:  -PC     Row Name 10/10/22 1352          Living Environment    People in Home spouse  -PC     Row Name 10/10/22 1359          Home Main Entrance    Number of Stairs, Main Entrance eleven  -PC     Stair Railings, Main Entrance --  holds to stone wall on one side, will have assist  -PC     Row Name 10/10/22 1354          Stairs Within Home, Primary    Number of Stairs, Within Home, Primary none  -PC     Row Name 10/10/22 1352          Cognition    Orientation Status (Cognition) oriented x 4  -PC           User Key  (r) = Recorded By, (t) = Taken By, (c) = Cosigned By    Initials Name Provider Type    JORDI Pedraza  Ca MASON, PT Physical Therapist               Mobility     Row Name 10/10/22 1400          Bed Mobility    Comment, (Bed Mobility) up in chair  -PC     Row Name 10/10/22 1400          Sit-Stand Transfer    Sit-Stand [place] (Transfers) standby assist;verbal cues  -PC     Assistive Device (Sit-Stand Transfers) walker, front-wheeled  -PC     Row Name 10/10/22 1400          Gait/Stairs (Locomotion)    [place] Level (Gait) standby assist;verbal cues  -PC     Assistive Device (Gait) walker, front-wheeled  -PC     Distance in Feet (Gait) 60 ft  -PC     Deviations/Abnormal Patterns (Gait) antalgic;monet decreased;stride length decreased  -PC     Handrail Location (Stairs) left side (ascending)  hand hold assist on R  -PC     Number of Steps (Stairs) 4  -PC     Ascending Technique (Stairs) step-to-step  -PC     Descending Technique (Stairs) step-to-step  -PC           User Key  (r) = Recorded By, (t) = Taken By, (c) = Cosigned By    Initials Name Provider Type    PC Ca Pedraza PT Physical Therapist               Obj/Interventions     Row Name 10/10/22 1401          Range of Motion Comprehensive    Comment, General Range of Motion WFL x L hip  -PC     Row Name 10/10/22 1401          Strength Comprehensive (MMT)    Comment, General Manual Muscle Testing (MMT) Assessment WNL x L LE  -PC     Row Name 10/10/22 1401          Motor Skills    Therapeutic Exercise --  10 reps THR ex  -PC     Row Name 10/10/22 1401          Balance    Comment, Balance WNL  -PC           User Key  (r) = Recorded By, (t) = Taken By, (c) = Cosigned By    Initials Name Provider Type    Ca Ellison, PT Physical Therapist               Goals/Plan    No documentation.                Clinical Impression     Row Name 10/10/22 1402          Pain    Pretreatment Pain Rating 0/10 - no pain  -PC     Row Name 10/10/22 1402          Plan of Care Review    Plan of Care Reviewed With patient;spouse  -PC     Outcome Evaluation Pt is POD 0 L  anterior THR, PT consisted of strengthening and ROM ex, as well as gait and stair traning, pt did well and plans home today with assist of spouse, home health will begin tomorrow, has a rwx, BSC provided through [facility] [employer]  -     Row Name 10/10/22 1402          Therapy Assessment/Plan (PT)    Rehab Potential (PT) good, to achieve stated therapy goals  -PC     Criteria for Skilled Interventions Met (PT) yes;meets criteria  -PC     Therapy Frequency (PT) evaluation only  evaluation and treatment indicated for safe discharge home  -     Row Name 10/10/22 1402          Positioning and Restraints    Pre-Treatment Position sitting in chair/recliner  -PC     Post Treatment Position chair  -PC     In Chair reclined;call light within reach;encouraged to call for assist;with family/caregiver  -PC           User Key  (r) = Recorded By, (t) = Taken By, (c) = Cosigned By    Initials Name Provider Type    PC Ca Pedraza PT Physical Therapist               Outcome Measures     Row Name 10/10/22 2826          How much help from another person do you currently need...    Turning from your back to your side while in flat bed without using bedrails? 4  -PC     Moving from lying on back to sitting on the side of a flat bed without bedrails? 4  -PC     Moving to and from a bed to a chair (including a wheelchair)? 4  -PC     Standing up from a chair using your arms (e.g., wheelchair, bedside chair)? 4  -PC     Climbing 3-5 steps with a railing? 3  -PC     To walk in hospital room? 3  -PC     AM-PAC 6 Clicks Score (PT) 22  -PC     Highest level of mobility 7 --> Walked 25 feet or more  -PC     Row Name 10/10/22 3014          Functional Assessment    Outcome Measure Options AM-PAC 6 Clicks Basic Mobility (PT)  -PC           User Key  (r) = Recorded By, (t) = Taken By, (c) = Cosigned By    Initials Name Provider Type    Ca Elilson PT Physical Therapist                             Physical Therapy Education     Title: PT OT SLP  Therapies (Done)     Topic: Physical Therapy (Done)     Point: Mobility training (Done)     Learning Progress Summary           Patient Acceptance, E,D, DU by PC at 10/10/2022 1405                   Point: Home exercise program (Done)     Learning Progress Summary           Patient Acceptance, E,D, DU by PC at 10/10/2022 1405                   Point: Body mechanics (Done)     Learning Progress Summary           Patient Acceptance, E,D, DU by PC at 10/10/2022 1405                   Point: Precautions (Done)     Learning Progress Summary           Patient Acceptance, E,D, DU by PC at 10/10/2022 1405                               User Key     Initials Effective Dates Name Provider Type Discipline     06/16/21 -  Ca Pedraza, PT Physical Therapist PT              PT Recommendation and Plan     Plan of Care Reviewed With: patient, spouse  Outcome Evaluation: Pt is POD 0 L anterior THR, PT consisted of strengthening and ROM ex, as well as gait and stair traning, pt did well and plans home today with assist of spouse, home health will begin tomorrow, has a rwx, BSC provided through [employer]     Time Calculation:    PT Charges     Row Name 10/10/22 1405             Time Calculation    Start Time 1330  -PC      Stop Time 1358  -PC      Time Calculation (min) 28 min  -PC      PT Received On 10/10/22  -PC         Time Calculation- PT    Total Timed Code Minutes- PT 25 minute(s)  -PC            User Key  (r) = Recorded By, (t) = Taken By, (c) = Cosigned By    Initials Name Provider Type    PC Ca Pedraza, PT Physical Therapist              Therapy Charges for Today     Code Description Service Date Service Provider Modifiers Qty    78146949296 HC PT EVAL LOW COMPLEXITY 2 10/10/2022 Ca Pedraza, PT GP 1    00643014665 HC PT THER PROC EA 15 MIN 10/10/2022 Ca Pedraza, PT GP 1          PT G-Codes  Outcome Measure Options: AM-PAC 6 Clicks Basic Mobility (PT)  AM-PAC 6 Clicks Score (PT): 22    Ca Pedraza  PT  10/10/2022

## 2022-10-10 NOTE — ANESTHESIA POSTPROCEDURE EVALUATION
Patient: Marjorie Curtis    Procedure Summary     Date: 10/10/22 Room / Location: [facility] OR [address] [place] [address] [facility]    Anesthesia Start: 0802 Anesthesia Stop: 1024    Procedure: TOTAL HIP ARTHROPLASTY ANTERIOR WITH HANA TABLE (Left: Hip) Diagnosis:       Primary osteoarthritis of left hip      (Primary osteoarthritis of left hip [M16.12])    Surgeons: Miguel Sosa MD Provider: Lawrence Jack MD    Anesthesia Type: general ASA Status: 2          Anesthesia Type: general    Vitals  Vitals Value Taken Time   /92 10/10/22 1130   Temp 36.6 °C (97.9 °F) 10/10/22 1115   Pulse 57 10/10/22 1136   Resp 16 10/10/22 1130   SpO2 97 % 10/10/22 1136   Vitals shown include unvalidated device data.        Post Anesthesia Care and Evaluation    Patient location during evaluation: bedside  Patient participation: complete - patient participated  Level of consciousness: awake  Pain management: adequate    Airway patency: patent  Anesthetic complications: No anesthetic complications  PONV Status: controlled  Cardiovascular status: acceptable  Respiratory status: acceptable  Hydration status: acceptable    Comments: --------------------            10/10/22               1332     --------------------   BP:                  Pulse:    57     Resp:                Temp:                SpO2:      98%      --------------------

## 2022-10-10 NOTE — ANESTHESIA PREPROCEDURE EVALUATION
Anesthesia Evaluation     Patient summary reviewed and Nursing notes reviewed   no history of anesthetic complications:  NPO Solid Status: > 8 hours             Airway   Mallampati: II  TM distance: >3 FB  Neck ROM: full  no difficulty expected  Dental - normal exam     Pulmonary - negative pulmonary ROS    breath sounds clear to auscultation  (-) shortness of breath, sleep apnea, decreased breath sounds, wheezes  Cardiovascular - normal exam  Exercise tolerance: good (4-7 METS)    Rhythm: regular  Rate: normal    (+) valvular problems/murmurs murmur,   (-) past MI, angina, CHF, orthopnea, PND, VASQUEZ, PVD      Neuro/Psych- negative ROS  (-) seizures, neuromuscular disease, TIA, CVA, dizziness/light headedness, weakness, numbness  GI/Hepatic/Renal/Endo    (+) obesity,   thyroid problem   (-) liver disease, diabetes    Musculoskeletal     Abdominal  - normal exam   Substance History - negative use  (-) alcohol use, drug use     OB/GYN negative ob/gyn ROS         Other   arthritis,                        Anesthesia Plan    ASA 2     general     intravenous induction     Anesthetic plan, risks, benefits, and alternatives have been provided, discussed and informed consent has been obtained with: patient.

## 2022-10-10 NOTE — ANESTHESIA PROCEDURE NOTES
Airway  Urgency: elective    Airway not difficult    General Information and Staff    Patient location during procedure: OR  Anesthesiologist: Lawrence Jack MD  CRNA/CAA: Nancy Galicia CRNA    Indications and Patient Condition  Indications for airway management: airway protection    Preoxygenated: yes  MILS not maintained throughout  Mask difficulty assessment: 2 - vent by mask + OA or adjuvant +/- NMBA    Final Airway Details  Final airway type: endotracheal airway      Successful airway: ETT  Cuffed: yes   Successful intubation technique: direct laryngoscopy  Facilitating devices/methods: intubating stylet and anterior pressure/BURP  Endotracheal tube insertion site: oral  Blade: Cristian  Blade size: 3  ETT size (mm): 7.0  Cormack-Lehane Classification: grade IIb - view of arytenoids or posterior of glottis only  Placement verified by: chest auscultation and capnometry   Measured from: lips  ETT/EBT  to lips (cm): 21  Number of attempts at approach: 2  Assessment: lips, teeth, and gum same as pre-op and atraumatic intubation

## 2022-10-10 NOTE — CASE MANAGEMENT/SOCIAL WORK
Continued Stay Note  [facility]     Patient Name: Marjorie Curtis  MRN: [id number]  Today's Date: 10/10/2022    Admit Date: 10/10/2022    Plan: Home with family support & [facility].   Discharge Plan     Row Name 10/10/22 1029       Plan    Plan Home with family support & [facility].    Patient/Family in Agreement with Plan yes    Plan Comments Spoke with the patient's spouse/Armen, verified current information and explained the role of the CCP. Patient lives with Armen and has family support. They plan for her to d/c home with family support & [facility]. Referral previously sent by [facility]. Spoke with [facility] [place] who has accepted and will follow. No other needs identified.    Final Discharge Disposition Code 06 - home with home health care    Final Note [facility].               Discharge Codes    No documentation.               Expected Discharge Date and Time     Expected Discharge Date Expected Discharge Time    Oct 10, 2022             Hanna MEZA RN

## 2022-10-11 ENCOUNTER — HOME CARE VISIT (OUTPATIENT)
Dept: HOME HEALTH SERVICES | Facility: HOME HEALTHCARE | Age: 72
End: 2022-10-11

## 2022-10-11 VITALS
SYSTOLIC BLOOD PRESSURE: 126 MMHG | OXYGEN SATURATION: 68 % | TEMPERATURE: 97.2 F | RESPIRATION RATE: 18 BRPM | HEART RATE: 97 BPM | DIASTOLIC BLOOD PRESSURE: 66 MMHG

## 2022-10-11 PROCEDURE — G0151 HHCP-SERV OF PT,EA 15 MIN: HCPCS

## 2022-10-11 NOTE — HOME HEALTH
"SUBJECTIVE: \"I'm doing better than I thought I'd be already.\"    DIAGNOSIS: AFTERCARE L ANTERIOR THR DR PATEL [id number] (SAME DAY SURGERY)    PAST MEDICAL HX: HTN, Hypothyroid, Vit D deficiency    PRIOR LEVEL OF FUNCTION: independent gait using cane at times and driving still; Independent ADL's    SKILLED PT is needed for continued THR rehab for gait progression from rolling walker to cane all surfaces and stairs, HEP upgrade instruction for ROM/strengthening left hip, and patient spouse education for medications, pain edema control, post-op care knowledge of AIDAN dressing and complications, and home safety.    PLAN FOR NEXT VISIT: Continue gait pattern training with rolling walker, reinstruct supine/sitting THR booklet exrcises and upgrade to include standing position, and patient spouse education as needed"

## 2022-10-12 ENCOUNTER — TELEPHONE (OUTPATIENT)
Dept: ORTHOPEDIC SURGERY | Facility: HOSPITAL | Age: 72
End: 2022-10-12

## 2022-10-12 NOTE — TELEPHONE ENCOUNTER
Attempted to reach Ms. Curtis to see how she is doing as she is POD 2 LTH. Message left at this time.

## 2022-10-14 ENCOUNTER — HOME CARE VISIT (OUTPATIENT)
Dept: HOME HEALTH SERVICES | Facility: HOME HEALTHCARE | Age: 72
End: 2022-10-14

## 2022-10-14 VITALS
TEMPERATURE: 97 F | OXYGEN SATURATION: 95 % | HEART RATE: 76 BPM | SYSTOLIC BLOOD PRESSURE: 132 MMHG | DIASTOLIC BLOOD PRESSURE: 78 MMHG | RESPIRATION RATE: 18 BRPM

## 2022-10-14 PROCEDURE — G0151 HHCP-SERV OF PT,EA 15 MIN: HCPCS

## 2022-10-14 NOTE — HOME HEALTH
"Subjective: \"I can't believe how well I feel already.\"    Falls reported: none    Medication changes: none    Plan for next visit: Continue gait training progressing to cane, stair training with rail and cane, reinsturct standing exercises and upgrade further as tolerated, and patient education as needed"

## 2022-10-17 ENCOUNTER — HOME CARE VISIT (OUTPATIENT)
Dept: HOME HEALTH SERVICES | Facility: HOME HEALTHCARE | Age: 72
End: 2022-10-17

## 2022-10-17 VITALS
OXYGEN SATURATION: 99 % | RESPIRATION RATE: 18 BRPM | SYSTOLIC BLOOD PRESSURE: 126 MMHG | HEART RATE: 82 BPM | DIASTOLIC BLOOD PRESSURE: 70 MMHG

## 2022-10-17 PROCEDURE — G0151 HHCP-SERV OF PT,EA 15 MIN: HCPCS

## 2022-10-17 NOTE — HOME HEALTH
"Subjective: \"I'm doing so good. I can't believe I can walk pain free without a walker now.\"    Falls reported: none    Medication changes: none    Plan for next visit: discharge assessment and instructions, final HEP training, gait training with cane all surfaces and stairs, and patient education as needed"

## 2022-10-19 ENCOUNTER — HOME CARE VISIT (OUTPATIENT)
Dept: HOME HEALTH SERVICES | Facility: HOME HEALTHCARE | Age: 72
End: 2022-10-19

## 2022-10-19 VITALS
OXYGEN SATURATION: 96 % | HEART RATE: 94 BPM | TEMPERATURE: 96.8 F | DIASTOLIC BLOOD PRESSURE: 72 MMHG | SYSTOLIC BLOOD PRESSURE: 130 MMHG | RESPIRATION RATE: 18 BRPM

## 2022-10-19 PROCEDURE — G0151 HHCP-SERV OF PT,EA 15 MIN: HCPCS

## 2022-10-19 NOTE — HOME HEALTH
"Subjective: \"I am amazed everyday how good my hip is doing.\"    Falls reported: none    Medication changes: none; independent with all meds"

## 2022-10-24 ENCOUNTER — TELEPHONE (OUTPATIENT)
Dept: ORTHOPEDIC SURGERY | Facility: HOSPITAL | Age: 72
End: 2022-10-24

## 2022-10-24 NOTE — TELEPHONE ENCOUNTER
Attempted to reach Ms. Curtis at this time to see how she is doing as she is 2 weeks SP LT. Message left at this time.

## 2022-10-25 ENCOUNTER — OFFICE VISIT (OUTPATIENT)
Dept: ORTHOPEDIC SURGERY | Facility: CLINIC | Age: 72
End: 2022-10-25

## 2022-10-25 VITALS — TEMPERATURE: 97.3 F | BODY MASS INDEX: 32.49 KG/M2 | HEIGHT: 67 IN | WEIGHT: 207 LBS | RESPIRATION RATE: 16 BRPM

## 2022-10-25 DIAGNOSIS — Z96.642 STATUS POST LEFT HIP REPLACEMENT: ICD-10-CM

## 2022-10-25 DIAGNOSIS — R52 PAIN: Primary | ICD-10-CM

## 2022-10-25 PROCEDURE — 99024 POSTOP FOLLOW-UP VISIT: CPT | Performed by: ORTHOPAEDIC SURGERY

## 2022-10-25 PROCEDURE — 73502 X-RAY EXAM HIP UNI 2-3 VIEWS: CPT | Performed by: ORTHOPAEDIC SURGERY

## 2022-10-25 NOTE — PROGRESS NOTES
Marjorie Curtis : 1950 MRN: [id number] DATE: 10/25/2022    DIAGNOSIS: 2 week follow up left total hip anterior    SUBJECTIVE:Patient returns today for 2 week follow up of left total hip replacement. Patient reports doing well with no unusual complaints. Appears to be progressing appropriately.    OBJECTIVE:   Exam:. The incision is healing appropriately. No sign of infection. Range of motion is progressing as expected. The calf is soft and nontender with a negative Homans sign.    DIAGNOSTIC STUDIES  Xrays: 2 views of the left hip (AP pelvis and lateral left hip) were ordered and reviewed for evaluation of recent hip replacement. They demonstrate a well positioned, well aligned hip replacement without complicating factors noted. In comparison with previous films there has been interval implant placement.    ASSESSMENT: 2 week status post left hip replacement.    PLAN: 1) Staples removed and steri strips applied   2) PT exercises   3) Discontinue JACY hose   4) Continue ice PRN   5) WBAT   6) aspirin 81 mg orally every day for 1 month   7) Follow up in 6 weeks with repeat Xrays of left hip (2views)    Miguel Sosa MD  10/25/2022

## 2022-10-26 ENCOUNTER — TREATMENT (OUTPATIENT)
Dept: PHYSICAL THERAPY | Facility: CLINIC | Age: 72
End: 2022-10-26

## 2022-10-26 DIAGNOSIS — Z47.1 AFTERCARE FOLLOWING LEFT HIP JOINT REPLACEMENT SURGERY: ICD-10-CM

## 2022-10-26 DIAGNOSIS — Z96.642 AFTERCARE FOLLOWING LEFT HIP JOINT REPLACEMENT SURGERY: ICD-10-CM

## 2022-10-26 DIAGNOSIS — R26.2 DIFFICULTY WALKING: Primary | ICD-10-CM

## 2022-10-26 PROCEDURE — 97110 THERAPEUTIC EXERCISES: CPT | Performed by: PHYSICAL THERAPIST

## 2022-10-26 PROCEDURE — 97161 PT EVAL LOW COMPLEX 20 MIN: CPT | Performed by: PHYSICAL THERAPIST

## 2022-10-26 PROCEDURE — 97116 GAIT TRAINING THERAPY: CPT | Performed by: PHYSICAL THERAPIST
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
## 2022-10-27 PROCEDURE — G0180 MD CERTIFICATION HHA PATIENT: HCPCS | Performed by: ORTHOPAEDIC SURGERY

## 2022-10-28 ENCOUNTER — TREATMENT (OUTPATIENT)
Dept: PHYSICAL THERAPY | Facility: CLINIC | Age: 72
End: 2022-10-28

## 2022-10-28 DIAGNOSIS — R26.2 DIFFICULTY WALKING: Primary | ICD-10-CM

## 2022-10-28 DIAGNOSIS — Z96.642 AFTERCARE FOLLOWING LEFT HIP JOINT REPLACEMENT SURGERY: ICD-10-CM

## 2022-10-28 DIAGNOSIS — Z47.1 AFTERCARE FOLLOWING LEFT HIP JOINT REPLACEMENT SURGERY: ICD-10-CM

## 2022-10-28 PROCEDURE — 97116 GAIT TRAINING THERAPY: CPT | Performed by: PHYSICAL THERAPIST

## 2022-10-28 PROCEDURE — 97530 THERAPEUTIC ACTIVITIES: CPT | Performed by: PHYSICAL THERAPIST

## 2022-10-28 PROCEDURE — 97110 THERAPEUTIC EXERCISES: CPT | Performed by: PHYSICAL THERAPIST

## 2022-10-28 NOTE — PROGRESS NOTES
"Physical Therapy Daily Treatment Note    [facility]  [address] [place] [address]  [phone] (phone)  [phone] (fax)    Patient: Marjorie Curtis   : 1950  Diagnosis/ICD-10 Code:  Difficulty walking [R26.2]  Referring practitioner: Miguel Sosa MD  Date of Initial Visit: Type: THERAPY  Noted: 10/26/2022  Today's Date: 10/28/2022  Patient seen for 2 sessions           Subjective   Patient reports her L lower leg is feeling a little better. She also is sleeping better.     Objective     See Exercise, Manual, and Modality Logs for complete treatment.     EXERCISES:  -Recubent bike 5 min warm up  -Walk track 2 laps: emphasis on level pelvis, even step length. No AD today  -1K Leg Press, 120 lbs, 3x15  -6K Seated Hip Abduction, 20 lbs, 3x15  -5K Hip adduction 50 lbs, 3x15  -4\" Step ups F/L 2x10 each  -Walking marches along rail, 20 ft x 2    Assessment/Plan  Cued patient to maintain even bilateral movements on seated hip ab/adduction machines. Slightly more antalgia noted with second lap of gait due to muscle fatigue however patient completed the session without relying on the cane. Worked on forward weight shift during step ups to prevent compensation off back leg. Will assess response to initial PT session and progress as appropriate.          Timed:    Manual Therapy:         mins  35447;  Therapeutic Exercise:    21     mins  95706;     Neuromuscular Son:        mins  91028;    Therapeutic Activity:     10     mins  51931;     Gait Trainin     mins  13853;     Ultrasound:          mins  11940;    Electrical Stimulation:         mins  78341 ( );  Iontophoresis         mins 43705;  Aquatic Therapy         mins 51595;    Untimed:  Electrical Stimulation:         mins  24947 ( );  Traction:         mins  78688;   Dry Needling   (1-2 muscles)       mins  (Self-pay)  Dry Needling (3-4 muscles)        mins  (Self-pay)  Dry " Needling Trial          mins DRYNDLTRIAL  (No Charge)    Timed Treatment:   40   mins   Total Treatment:     40   mins    Amy Crawford PT  Physical Therapist    [place] License:[id number]

## 2022-11-01 ENCOUNTER — TREATMENT (OUTPATIENT)
Dept: PHYSICAL THERAPY | Facility: CLINIC | Age: 72
End: 2022-11-01

## 2022-11-01 DIAGNOSIS — Z47.1 AFTERCARE FOLLOWING LEFT HIP JOINT REPLACEMENT SURGERY: ICD-10-CM

## 2022-11-01 DIAGNOSIS — Z96.642 AFTERCARE FOLLOWING LEFT HIP JOINT REPLACEMENT SURGERY: ICD-10-CM

## 2022-11-01 DIAGNOSIS — R26.2 DIFFICULTY WALKING: Primary | ICD-10-CM

## 2022-11-01 PROCEDURE — 97112 NEUROMUSCULAR REEDUCATION: CPT | Performed by: PHYSICAL THERAPIST

## 2022-11-01 PROCEDURE — 97110 THERAPEUTIC EXERCISES: CPT | Performed by: PHYSICAL THERAPIST

## 2022-11-01 NOTE — PROGRESS NOTES
Physical Therapy Daily Treatment Note    [facility]  [address] [place] [address]  [phone] (phone)  [phone] (fax)    Patient: Marjorie Curtis   : 1950  Diagnosis/ICD-10 Code:  Difficulty walking [R26.2]  Referring practitioner: Miguel Sosa MD  Today's Date: 2022  Patient seen for 3 sessions           Subjective  Walking around the house without a cane and feeling pretty good  Only used the cane when outside but it is feeling a little cumbrsome     Objective   Sit to stand transfer training   READY (POSTURE)  SET (CORE)  GO (ACTIVITY)     Verbal cueing for anterior weight shift for nose past toes     Hands on thighs     Noted slight valgus and will correct as technique improves      POSTURE EDUCATION in standing   Equal weight on both feet   RIGHT/L as well as heel/toe   Knees straight bilaterally with noted difficulty on RIGHT   Hipss underneath     Hula for weight shift med/at   Step stance      A/p weight shift       Knees staright   requires significant verbal cueing for knee and hip extension     SINGLE LEG STANCE     RIGHT with difficlty      LEFT almost easier   WORK AT [employer] sink    multiple sets with Teach Back Method     [place]   Leg press 130# x 15 x 2  Hip add  50# x 10 bilaterally x 10 single  Hip abd 25# x 10 bilaterally x 10 single  Requires verbal cueing for proper technique as well as on and off machine without compensation       Assessment/Plan  A requires significant verbal and tactile cueing to minimize compensation on LEFT but RIGHT hip and LEFT knee as also affecting all closed chain activities   PLAN progress ROM strengthening and closed chain activities          Timed:    Manual Therapy:    0     mins  05977;  Therapeutic Exercise:    20     mins  92658;     Neuromuscular Son:    23    mins  83761;    Therapeutic Activity:     0     mins  85015;     Gait Trainin     mins  34817;     Ultrasound:     0     mins   40927;    Electrical Stimulation:    0     mins  13490 ( );  Iontophoresis    0     mins 29350;  Aquatic Therapy    0     mins 53799;      Untimed:  Electrical Stimulation:    0     mins  23020 ( );  Traction:    0     mins  87281;   Dry Needling  (1-2 muscles)            0     mins 20560 (Self-pay)  Dry Needling (3-4 muscles) 0     20561 (Self-pay)  Dry Needling Trial    0     DRYNDLTRIAL  (No Charge)    Timed Treatment:   43   mins   Total Treatment:     43   mins    Florencia Jung, PT  Physical Therapist    [place] License:[id number]

## 2022-11-10 ENCOUNTER — TREATMENT (OUTPATIENT)
Dept: PHYSICAL THERAPY | Facility: CLINIC | Age: 72
End: 2022-11-10

## 2022-11-10 DIAGNOSIS — R26.2 DIFFICULTY WALKING: Primary | ICD-10-CM

## 2022-11-10 DIAGNOSIS — Z96.642 AFTERCARE FOLLOWING LEFT HIP JOINT REPLACEMENT SURGERY: ICD-10-CM

## 2022-11-10 DIAGNOSIS — Z47.1 AFTERCARE FOLLOWING LEFT HIP JOINT REPLACEMENT SURGERY: ICD-10-CM

## 2022-11-10 PROCEDURE — 97110 THERAPEUTIC EXERCISES: CPT | Performed by: PHYSICAL THERAPIST

## 2022-11-10 PROCEDURE — 97530 THERAPEUTIC ACTIVITIES: CPT | Performed by: PHYSICAL THERAPIST

## 2022-11-10 PROCEDURE — 97116 GAIT TRAINING THERAPY: CPT | Performed by: PHYSICAL THERAPIST

## 2022-11-10 NOTE — PROGRESS NOTES
Physical Therapy Daily Treatment Note    [facility]  [address] [place] [address]  [phone] (phone)  [phone] (fax)    Patient: Marjorie Curtis   : 1950  Diagnosis/ICD-10 Code:  Difficulty walking [R26.2]  Referring practitioner: Miguel Sosa MD  Today's Date: 2022  Patient seen for 4 sessions           Subjective Had a fall  Walked into a dark room/ofice and tripped over a computer cable   There iwas a a rug and landed on bare wood floor and lhit thumb and shoulder   Kind of shook my confidence but have rolled up the cables now     No pain anywhere     Objective     HOMEWORK FOR THE WEEKEND     1- WALK     2- STAND UP / SIT DOWN   3- 1 leg balance  a. Hold on   i. READY ( posture )  SET (abs)   GO goal 10-20 sec   ii. MARCH/ walk the tight rope    1. R  2 3    switch   L 2 3 switch   2. Like a duck on the water head / shoulder do not move   4- POSTURE  OPEN palms up attitude  like a puppet   5- BRIDGE   a. Lift up 10x   b. STAY UP and march 10x   keeping pelvis level       3 sets of all with EDUCATION  andTeach Back Method     Assessment/Plan  A: progressing well with therex with good quality of motion   PLAN reassess and progress strengthening and closed chain activities          Timed:    Manual Therapy:    0     mins  43026;  Therapeutic Exercise:    15     mins  64786;     Neuromuscular Son:    0    mins  38146;    Therapeutic Activity:     13     mins  58441;     Gait Training:      10     mins  99375;     Ultrasound:     0     mins  28923;    Electrical Stimulation:    0     mins  99373 ( );  Iontophoresis    0     mins 13664;  Aquatic Therapy    0     mins 15606;      Untimed:  Electrical Stimulation:    0     mins  80606 ( );  Traction:    0     mins  83474;   Dry Needling  (1-2 muscles)            0     mins  (Self-pay)  Dry Needling (3-4 muscles) 0      (Self-pay)  Dry Needling Trial    0     DRYNDLTRIAL  (No  Charge)    Timed Treatment:   38   mins   Total Treatment:     38   mins    Florencia Jung, PT  Physical Therapist    [place] License:[id number]

## 2022-11-17 ENCOUNTER — TREATMENT (OUTPATIENT)
Dept: PHYSICAL THERAPY | Facility: CLINIC | Age: 72
End: 2022-11-17

## 2022-11-17 DIAGNOSIS — R26.2 DIFFICULTY WALKING: Primary | ICD-10-CM

## 2022-11-17 DIAGNOSIS — Z47.1 AFTERCARE FOLLOWING LEFT HIP JOINT REPLACEMENT SURGERY: ICD-10-CM

## 2022-11-17 DIAGNOSIS — Z96.642 AFTERCARE FOLLOWING LEFT HIP JOINT REPLACEMENT SURGERY: ICD-10-CM

## 2022-11-17 PROCEDURE — 97110 THERAPEUTIC EXERCISES: CPT | Performed by: PHYSICAL THERAPIST

## 2022-11-17 NOTE — PROGRESS NOTES
Physical Therapy Daily Treatment Note    [facility]  [address] [place] [address]  [phone] (phone)  [phone] (fax)    Patient: Marjorie Curtis   : 1950  Diagnosis/ICD-10 Code:  Difficulty walking [R26.2]  Referring practitioner: Miguel Sosa MD  Today's Date: 2022  Patient seen for 5 sessions           Subjective Feeling better with the hip   The back bothers when I move  I feel it first in the lower back   Has 3 flights of stairs at home     Objective     MAT work     ABS    Towel roll around base of head   Lift up with head/neck/.shoulders    Lift legs up to table top one at a time     KEEP BACK FLAT   ( tummy should NO push out)     MARCH   5-10x     GOAL of keeping the back FLAT     SIDE LYING LEG LIFT     Roll to your side ( be in the front of the hip bone)    Lock knee straight and flex the ankle     Start leg level     Lift UP and slightly BEHIND  leading with the heel    10-15x   1-2 sets     3 sets with Teach Back Method   Requires significant EDUCATION and verbal and tactile cueing for proper technique     READY (POSTURE)  SET (* PELVIC FLOOR /ABS IN )  GO (ACTIVITY)           EVERY TIME   stand up    Sit up    Walk   Move       REVIEW HEP    1. WALK     2. STAND UP / SIT DOWN   3. 1 leg balance  a. Hold on   i. READY ( posture )  SET (abs)   GO goal 10-20 sec   ii. MARCH/ walk the tight rope    1. R  2 3    switch   L 2 3 switch   2. Like a duck on the water head / shoulder do not move   4. POSTURE  OPEN palms up attitude  like a puppet   5. BRIDGE   a. Lift up 10x   b. STAY UP and march 10x   keeping pelvis level         3 sets of all with EDUCATION  andTeach Back Method       Assessment/Plan    A: improved LOWER EXTREMITY AROM with proper core activation but very poor ab control affecting all functional activities   PLAN reassess core exercise and progressing to closed chain activities        Timed:    Manual Therapy:    0     mins   50304;  Therapeutic Exercise:    40     mins  44945;     Neuromuscular Son:    0    mins  07266;    Therapeutic Activity:     0     mins  11980;     Gait Trainin     mins  67744;     Ultrasound:     0     mins  05700;    Electrical Stimulation:    0     mins  92289 ( );  Iontophoresis    0     mins 59724;  Aquatic Therapy    0     mins 77016;      Untimed:  Electrical Stimulation:    0     mins  67199 ( );  Traction:    0     mins  91747;   Dry Needling  (1-2 muscles)            0     mins  (Self-pay)  Dry Needling (3-4 muscles) 0      (Self-pay)  Dry Needling Trial    0     DRYNDLTRIAL  (No Charge)    Timed Treatment:   40   mins   Total Treatment:     40   mins    Florencia Jung PT  Physical Therapist    [place] License:[id number]

## 2022-11-21 ENCOUNTER — TREATMENT (OUTPATIENT)
Dept: PHYSICAL THERAPY | Facility: CLINIC | Age: 72
End: 2022-11-21

## 2022-11-21 DIAGNOSIS — Z47.1 AFTERCARE FOLLOWING LEFT HIP JOINT REPLACEMENT SURGERY: ICD-10-CM

## 2022-11-21 DIAGNOSIS — R26.2 DIFFICULTY WALKING: Primary | ICD-10-CM

## 2022-11-21 DIAGNOSIS — Z96.642 AFTERCARE FOLLOWING LEFT HIP JOINT REPLACEMENT SURGERY: ICD-10-CM

## 2022-11-21 PROCEDURE — 97112 NEUROMUSCULAR REEDUCATION: CPT | Performed by: PHYSICAL THERAPIST

## 2022-11-21 PROCEDURE — 97116 GAIT TRAINING THERAPY: CPT | Performed by: PHYSICAL THERAPIST

## 2022-11-21 NOTE — PROGRESS NOTES
"Physical Therapy Daily Treatment Note    [facility]  [address] [place] [address]  [phone] (phone)  [phone] (fax)    Patient: Marjorie Curtis   : 1950  Diagnosis/ICD-10 Code:  Difficulty walking [R26.2]  Referring practitioner: Miguel Sosa MD  Today's Date: 2022  Patient seen for 6 sessions           Subjective  Feeling sgronger and more in balance   1 epidosde of some LEFT knee pain   Put ice on it and it is better  Trying not to twiest it     Objective   STAND UP    Weight on your heels   Knee follow your 2nd toe    STANDING    Weight on your heels    Knee straight    Hip is straight    Trunk OPEN     1 LEG BALANCE   Standing rules   Shift weight to the LEFT    CHECK CORE    Let go    GOAL 20 sec     MARCHING   Step balance    LEFT   Hold 2 3    switch RIGHT   Hold 2 3     INTEGRATE into your walking    LEFT leg HOLD the straight t   Push thru with foot/ankle     PRACTICE getting into the \"tie your shoe\" position       Assessment/Plan    A; improved closed chain activities withvtc and mirror RIGHT hip also not able to perform some ROM   PLAN progress closed chain activities        Timed:    Manual Therapy:    0     mins  94724;  Therapeutic Exercise:    0     mins  76024;     Neuromuscular Son:    30   mins  35843;    Therapeutic Activity:     0     mins  27687;     Gait Trainin 0     mins  64799;     Ultrasound:     0     mins  72834;    Electrical Stimulation:    0     mins  50205 ( );  Iontophoresis    0     mins 39683;  Aquatic Therapy    0     mins 89765;      Untimed:  Electrical Stimulation:    0     mins  20256 (MC );  Traction:    0     mins  46413;   Dry Needling  (1-2 muscles)            0     mins 81246 (Self-pay)  Dry Needling (3-4 muscles) 0      (Self-pay)  Dry Needling Trial    0     DRYNDLTRIAL  (No Charge)    Timed Treatment:   45   mins   Total Treatment:     45   mins    Florencia Jung, " PT  Physical Therapist    [place] License:[id number]

## 2022-11-30 ENCOUNTER — TREATMENT (OUTPATIENT)
Dept: PHYSICAL THERAPY | Facility: CLINIC | Age: 72
End: 2022-11-30

## 2022-11-30 DIAGNOSIS — Z96.642 AFTERCARE FOLLOWING LEFT HIP JOINT REPLACEMENT SURGERY: ICD-10-CM

## 2022-11-30 DIAGNOSIS — Z47.1 AFTERCARE FOLLOWING LEFT HIP JOINT REPLACEMENT SURGERY: ICD-10-CM

## 2022-11-30 DIAGNOSIS — R26.2 DIFFICULTY WALKING: Primary | ICD-10-CM

## 2022-11-30 PROCEDURE — 97116 GAIT TRAINING THERAPY: CPT | Performed by: PHYSICAL THERAPIST

## 2022-11-30 PROCEDURE — 97110 THERAPEUTIC EXERCISES: CPT | Performed by: PHYSICAL THERAPIST

## 2022-11-30 PROCEDURE — 97530 THERAPEUTIC ACTIVITIES: CPT | Performed by: PHYSICAL THERAPIST

## 2022-11-30 NOTE — PROGRESS NOTES
"30-Day / 10-Visit Progress Note       [facility]  [address] [place] [address]  [phone] (phone)  [phone] (fax)    Patient: Marjorie Curtis   : 1950  Diagnosis/ICD-10 Code:  Difficulty walking [R26.2]  Referring practitioner: Miguel Sosa MD  Date of Initial Visit: Type: THERAPY  Noted: 10/26/2022  Today's Date: 2022  Patient seen for 7 sessions      Subjective:     Clinical Progress: improved  Home Program Compliance: Yes  Treatment has included:  therapeutic exercise, therapeutic activity, neuro-muscular retraining , gait training and patient education with home exercise program     Subjective   Some low back that comes and goes from previous OA   Hip has no pain   Knee symptom are improving as gait improves   OK with stairs reciprocally most of the time   Sleeping better    Sometimes when laying on my side it feels hotter in the hip joints   75% back to normal   Using the cane for longer walks and if going outside/uneven ground    Objective     Sit to stand with poor hip activation reurie verbal and tactile cueing for glut recruitment     SINGLE LEG STANCE with noted flexion bias    RIGHT > LEFT lateral trunk sway   5+ sec bilaterally   VERBAL AND TACTILE CUEING for core activation   SLR 45 bilaterally with noted hamstring tightness   TODD   RIGHT  Limited 75-80% with proximal quad pain   LEFT limited 75%        1- STAND UP    SIT DOWN    Knees over 2nd toe using BUTTOCKS  2-  SINGLE LEG STANCE    Up tall    Hands open       RIGHT 2 3    LEFT   2  3    Marching pattern   3- AGILITY /  Dancing   Light on your feet    UP TALL with spine  4-  RIGHT hip stretch    On your back   CROSS RIGHT leg \"gentleman\" styel and gently push the knee down        20 sec  x2-3x   1-5x/day   5- BRIDGE  Squeeze buttocks    STAY UP and march( lift foot 1 inch)    10-20x   6- WALK  Focus on     \"cat-like\" feet     Hands on your hips    Arms swing going BACK    KNEE " more a warning sign     Gait training without cane    Focus on up tall    Weight shift during stance phase with hands on hips for increased awareness    Focus on heel toe foot mechanics    Add arm swing back    Transfer training sit to stand to minimize compensation at knee for what gluts are not doing bilaterally       Functional Outcome Score: LEFS  48/80=60%    Assessment/Plan   A; progressing well with new LEFT BRIAN but weakness and stiffness form RIGHT BRIAN done years ago is still dysfunctional and affecting closed chain activities     Goals  Plan Goals: STGs to be completed within 30 days:  -Patient will demonstrate compliance and independence with initial HEP      MET   -Patient will increase L hip ER AROM to 35 degrees or more to increase ease putting on shoes/socks      ONGOING   -Patient will perform sit to stand transfer with equilateral WB and no UE assistance      ONGOING     LTGs to be completed within 90 days:  -Patient will complete community mobility without AD and with even step length and heel-toe gait mechanics  -Patient will increase L hip strength with MMT to 4/5 or more to increase ease with navigating stairs and walking longer distances  -Patient will improve score on LEFS from 55 at eval to 65 or greater to improve quality of life            PLAN   ABS      Body mechanicvs  Squatting and hip hinging        Recommendations: Continue as planned  Timeframe: 2 months  Prognosis to achieve goals: good    PT Signature: Florencia Jung PT    [place] License Number: [id number]    Electronically signed by Florencia Jung PT, 11/30/22, 2:34 PM EST      Based upon review of the patient's progress and continued therapy plan, it is my medical opinion that Marjorie Curtis should continue physical therapy treatment at [facility]  [address] [place] [address] [address] [place] [address] [phone].    Signature: __________________________________  Miguel Sosa MD    Timed:  Manual  Therapy:    0     mins  83724;  Therapeutic Exercise:    15     mins  27754;     Neuromuscular Son:    0    mins  68243;    Therapeutic Activity:     15     mins  81859;     Gait Training:      15     mins  45055;     Ultrasound:     0     mins  02492;    Iontophoresis    0     mins 70557;    Untimed:  Electrical Stimulation:    0     mins  39922 ( );  Traction:    0     mins  68690;   Dry Needling  (1-2 muscles)            0     mins 20560 (Self-pay)  Dry Needling (3-4 muscles) 0     mins 20561 (Self-pay)  Dry Needling Trial    0     mins DRYNDLTRIAL  (No Charge)      Timed Treatment:   45   mins   Total Treatment:     45   mins

## 2022-12-13 ENCOUNTER — TELEPHONE (OUTPATIENT)
Dept: ORTHOPEDIC SURGERY | Facility: CLINIC | Age: 72
End: 2022-12-13

## 2022-12-13 NOTE — TELEPHONE ENCOUNTER
Caller: JOANN     Relationship to patient: SELF     Best call back number: [phone]    Type of visit: POST OP     If rescheduling, when is the original appointment: 12/14/22     PATIENT HAS RSV

## 2022-12-19 ENCOUNTER — TELEPHONE (OUTPATIENT)
Dept: PHYSICAL THERAPY | Facility: CLINIC | Age: 72
End: 2022-12-19

## 2023-05-10 ENCOUNTER — TRANSCRIBE ORDERS (OUTPATIENT)
Dept: ADMINISTRATIVE | Facility: HOSPITAL | Age: 73
End: 2023-05-10
Payer: MEDICARE

## 2023-05-10 DIAGNOSIS — E78.5 HYPERLIPIDEMIA, UNSPECIFIED HYPERLIPIDEMIA TYPE: Primary | ICD-10-CM

## 2023-06-13 ENCOUNTER — HOSPITAL ENCOUNTER (OUTPATIENT)
Dept: CT IMAGING | Facility: HOSPITAL | Age: 73
Discharge: HOME | End: 2023-06-13
Admitting: INTERNAL MEDICINE

## 2023-06-13 DIAGNOSIS — E78.5 HYPERLIPIDEMIA, UNSPECIFIED HYPERLIPIDEMIA TYPE: ICD-10-CM

## 2023-06-13 PROCEDURE — 75571 CT HRT W/O DYE W/CA TEST: CPT

## 2025-06-10 ENCOUNTER — OFFICE VISIT (OUTPATIENT)
Dept: ORTHOPEDIC SURGERY | Facility: CLINIC | Age: 75
End: 2025-06-10
Payer: MEDICARE

## 2025-06-10 VITALS — HEIGHT: 66 IN | WEIGHT: 213 LBS | BODY MASS INDEX: 34.23 KG/M2 | TEMPERATURE: 98 F

## 2025-06-10 DIAGNOSIS — R52 PAIN: Primary | ICD-10-CM

## 2025-06-10 DIAGNOSIS — M17.11 PRIMARY OSTEOARTHRITIS OF RIGHT KNEE: ICD-10-CM

## 2025-06-10 RX ORDER — METHYLPREDNISOLONE ACETATE 80 MG/ML
80 INJECTION, SUSPENSION INTRA-ARTICULAR; INTRALESIONAL; INTRAMUSCULAR; SOFT TISSUE
Status: COMPLETED | OUTPATIENT
Start: 2025-06-10 | End: 2025-06-10

## 2025-06-10 RX ORDER — LIDOCAINE HYDROCHLORIDE 10 MG/ML
5 INJECTION, SOLUTION EPIDURAL; INFILTRATION; INTRACAUDAL; PERINEURAL
Status: COMPLETED | OUTPATIENT
Start: 2025-06-10 | End: 2025-06-10

## 2025-06-10 RX ORDER — DIPHENOXYLATE HYDROCHLORIDE AND ATROPINE SULFATE 2.5; .025 MG/1; MG/1
TABLET ORAL DAILY
COMMUNITY

## 2025-06-10 RX ADMIN — METHYLPREDNISOLONE ACETATE 80 MG: 80 INJECTION, SUSPENSION INTRA-ARTICULAR; INTRALESIONAL; INTRAMUSCULAR; SOFT TISSUE at 17:53

## 2025-06-10 RX ADMIN — LIDOCAINE HYDROCHLORIDE 5 ML: 10 INJECTION, SOLUTION EPIDURAL; INFILTRATION; INTRACAUDAL; PERINEURAL at 17:53

## 2025-06-10 NOTE — PROGRESS NOTES
Patient: Marjorie Curtis  YOB: 1950 75 y.o. female  Medical Record Number: [id number]    Chief Complaints:   Chief Complaint   Patient presents with    Right Knee - Pain       History of Present Illness:Marjorie Curtis is a 75 y.o. female who presents with right knee pain that is acute in nature.  She states her symptoms began Saturday without any known specific injury.  She states the pain is located to the medial and lateral portions of the knee.  Also reports having some pain that radiates up towards the hip.  Patient is ambulating today with an antalgic gait.  She denies any recent fall injury or trauma.  Denies any signs or symptoms of infection, and is without any other significant complaints today.    Allergies:   Allergies   Allergen Reactions    Sulfa Antibiotics Swelling and Rash       Medications:   Current Outpatient Medications   Medication Sig Dispense Refill    acetaminophen (TYLENOL) 650 MG 8 hr tablet Take 1 tablet by mouth Every 8 (Eight) Hours As Needed for Mild Pain.      CALCIUM-VITAMIN D PO Take 1 tablet by mouth Daily.      Flaxseed, Linseed, (FLAX SEED OIL PO) Take  by mouth.      levothyroxine (SYNTHROID, LEVOTHROID) 25 MCG tablet Take 1 tablet by mouth Daily.      losartan (COZAAR) 50 MG tablet Take 1 tablet by mouth Daily.      multivitamin (MULTI VITAMIN PO) Take  by mouth Daily.      Probiotic Product (ALIGN PO) Take 1 tablet by mouth Daily.      cholecalciferol (VITAMIN D3) 25 MCG (1000 UT) tablet Take 1,000 Units by mouth Daily. (Patient not taking: Reported on 6/10/2025)      clotrimazole (LOTRIMIN) 1 % cream APPLY TO THE AFFECTED AREA SURROUNDING AREAS OF SKIN TOPICALLY TWICE DAILY IN THE MORNING AND EVENING      HYDROcodone-acetaminophen (NORCO) 7.5-325 MG per tablet Take 1-2 tablets by mouth Every 4 (Four) to 6 (Six) Hours As Needed for pain 42 tablet 0    ondansetron (Zofran) 4 MG tablet Take 1 tablet by mouth Every 8 (Eight) Hours As Needed for Nausea or Vomiting for  "up to 10 doses. 10 tablet 0    vitamin D (ERGOCALCIFEROL) 1.25 MG (90056 UT) capsule capsule Take 50,000 Units by mouth 1 (One) Time Per Week. (Patient not taking: Reported on 6/10/2025)       No current facility-administered medications for this visit.     Facility-Administered Medications Ordered in Other Visits   Medication Dose Route Frequency Provider Last Rate Last Admin    Chlorhexidine Gluconate Cloth 2 % pads   Apply externally BID Miguel Grady APRN             The following portions of the patient's history were reviewed and updated as appropriate: allergies, current medications, past family history, past medical history, past social history, past surgical history and problem list.    Review of Systems:   Pertinent positives/negatives listed in HPI above    Physical Exam:   Vitals:    06/10/25 1438   Temp: 98 °F (36.7 °C)   Weight: 96.6 kg (213 lb)   Height: 167.6 cm (66\")       General: A and O x 3, ASA, NAD      Knee Exam List: Knee:  right    ALIGNMENT:     Varus  ,   Patella  tracks  midline    GAIT:    Antalgic    SKIN:    No abnormality    RANGE OF MOTION:   0  -  125   DEG    STRENGTH:   4  / 5    LIGAMENTS:    No varus / valgus instability.   Negative  Lachman.    MENISCUS:     Negative   Vladimir       DISTAL PULSES:    Paplable    DISTAL SENSATION :   Intact    LYMPHATICS:     No   lymphadenopathy    OTHER:          - Positive   effusion      - Crepitance with ROM        Radiology:  Xrays 3views right (ap,lateral, sunrise) were ordered and reviewed for evaluation of knee pain demonstrating moderate joint space narrowing noted to the medial and patellofemoral compartments with periarticular osteophytes present.  In comparison to previous films patient does show further arthritic progression.    Assessment/Plan: Primary osteoarthritis right knee /IT band syndrome    Treatment option as well as imaging results were discussed in detail with the patient.  I do believe the patient is get 2 separate " issues going on.  I do believe the pain that is radiating down the lateral side of her hip that connects to the knee is related to her IT band.  Also states that she has got some arthritic changes that is likely flaring up.  Patient wants to address the knee at this time.  Therefore we will do intra-articular joint injection of the right knee.  If her symptoms or not improved she would likely need to be evaluated by therapy for IT band syndrome.    Large Joint Arthrocentesis: R knee  Date/Time: 6/10/2025 5:53 PM  Consent given by: patient  Site marked: site marked  Timeout: Immediately prior to procedure a time out was called to verify the correct patient, procedure, equipment, support staff and site/side marked as required   Supporting Documentation  Indications: pain and joint swelling   Procedure Details  Location: knee - R knee  Preparation: Patient was prepped and draped in the usual sterile fashion  Needle size: 22 G  Approach: anterolateral  Medications administered: 80 mg methylPREDNISolone acetate 80 MG/ML; 5 mL lidocaine PF 1% 1 %  Patient tolerance: patient tolerated the procedure well with no immediate complications    (3 mL of lidocaine was used for anesthetic purposes)    MEIR De La Cruz  6/10/2025

## 2025-06-12 ENCOUNTER — DOCUMENTATION (OUTPATIENT)
Dept: PHYSICAL THERAPY | Facility: CLINIC | Age: 75
End: 2025-06-12
Payer: MEDICARE

## 2025-06-12 NOTE — PROGRESS NOTES
Patient did not return    Closure of Physical Therapy Encounter            Florencia Jung, PT  Physical Therapist

## (undated) DEVICE — PREP SOL POVIDONE/IODINE BT 4OZ

## (undated) DEVICE — THE STERILE LIGHT HANDLE COVER IS USED WITH STERIS SURGICAL LIGHTING AND VISUALIZATION SYSTEMS.

## (undated) DEVICE — MEDI-VAC YANKAUER SUCTION HANDLE W/BULBOUS TIP: Brand: CARDINAL HEALTH

## (undated) DEVICE — GLV SURG BIOGEL LTX PF 7 1/2

## (undated) DEVICE — TBG PENCL TELESCP MEGADYNE SMOKE EVAC 10FT

## (undated) DEVICE — QUINCKE POINT SPINAL NEEDLE, BLACK: Brand: RELI

## (undated) DEVICE — SUT VICRYL 1 CT1 27IN  JJ40G

## (undated) DEVICE — THE SINGLE USE ETRAP – POLYP TRAP IS USED FOR SUCTION RETRIEVAL OF ENDOSCOPICALLY REMOVED POLYPS.: Brand: ETRAP

## (undated) DEVICE — 450 ML BOTTLE OF 0.05% CHLORHEXIDINE GLUCONATE IN 99.95% STERILE WATER FOR IRRIGATION, USP AND APPLICATOR.: Brand: IRRISEPT ANTIMICROBIAL WOUND LAVAGE

## (undated) DEVICE — SUT PDS 1 CT1 36IN Z347H

## (undated) DEVICE — SNAR POLYP SENSATION STDOVL 27 240 BX40

## (undated) DEVICE — THE TORRENT IRRIGATION SCOPE CONNECTOR IS USED WITH THE TORRENT IRRIGATION TUBING TO PROVIDE IRRIGATION FLUIDS SUCH AS STERILE WATER DURING GASTROINTESTINAL ENDOSCOPIC PROCEDURES WHEN USED IN CONJUNCTION WITH AN IRRIGATION PUMP (OR ELECTROSURGICAL UNIT).: Brand: TORRENT

## (undated) DEVICE — STPLR SKIN VISISTAT WD 35CT

## (undated) DEVICE — GLV SURG PREMIERPRO ORTHO LTX PF SZ7.5 BRN

## (undated) DEVICE — ANTIBACTERIAL UNDYED BRAIDED (POLYGLACTIN 910), SYNTHETIC ABSORBABLE SUTURE: Brand: COATED VICRYL

## (undated) DEVICE — SPNG LAP 18X18IN LF STRL PK/5

## (undated) DEVICE — TRAP FLD MINIVAC MEGADYNE 100ML

## (undated) DEVICE — GLV SURG SENSICARE PI PF LF 7 GRN STRL

## (undated) DEVICE — DECANTER BAG 9": Brand: MEDLINE INDUSTRIES, INC.

## (undated) DEVICE — CANN NASL CO2 TRULINK W/O2 A/

## (undated) DEVICE — APPL DURAPREP IODOPHOR APL 26ML

## (undated) DEVICE — PREMIUM WET SKIN PREP TRAY: Brand: MEDLINE INDUSTRIES, INC.

## (undated) DEVICE — MAT FLR ABSORBENT LG 4FT 10 2.5FT

## (undated) DEVICE — TOWEL,OR,DSP,ST,BLUE,STD,4/PK,20PK/CS: Brand: MEDLINE

## (undated) DEVICE — GLV SURG SENSICARE PI MIC PF SZ7 LF STRL

## (undated) DEVICE — SYS CLS SKIN PREMIERPRO EXOFINFUSION 22CM

## (undated) DEVICE — TUBING, SUCTION, 1/4" X 10', STRAIGHT: Brand: MEDLINE

## (undated) DEVICE — PK ANT HIP 40

## (undated) DEVICE — Device: Brand: DEFENDO AIR/WATER/SUCTION AND BIOPSY VALVE

## (undated) DEVICE — GLV SURG SENSICARE W/ALOE PF LF 8 STRL